# Patient Record
Sex: MALE | Race: WHITE | NOT HISPANIC OR LATINO | Employment: FULL TIME | URBAN - NONMETROPOLITAN AREA
[De-identification: names, ages, dates, MRNs, and addresses within clinical notes are randomized per-mention and may not be internally consistent; named-entity substitution may affect disease eponyms.]

---

## 2019-08-31 ENCOUNTER — OFFICE VISIT (OUTPATIENT)
Dept: INTERNAL MEDICINE | Facility: CLINIC | Age: 24
End: 2019-08-31

## 2019-08-31 VITALS
WEIGHT: 296.2 LBS | DIASTOLIC BLOOD PRESSURE: 100 MMHG | TEMPERATURE: 98.1 F | HEIGHT: 73 IN | OXYGEN SATURATION: 98 % | BODY MASS INDEX: 39.25 KG/M2 | SYSTOLIC BLOOD PRESSURE: 150 MMHG | RESPIRATION RATE: 16 BRPM | HEART RATE: 90 BPM

## 2019-08-31 DIAGNOSIS — H61.22 EXCESSIVE CERUMEN IN EAR CANAL, LEFT: ICD-10-CM

## 2019-08-31 DIAGNOSIS — E66.09 CLASS 2 OBESITY DUE TO EXCESS CALORIES WITHOUT SERIOUS COMORBIDITY WITH BODY MASS INDEX (BMI) OF 39.0 TO 39.9 IN ADULT: ICD-10-CM

## 2019-08-31 DIAGNOSIS — L24.5 IRRITANT CONTACT DERMATITIS DUE TO OTHER CHEMICAL PRODUCTS: ICD-10-CM

## 2019-08-31 DIAGNOSIS — I10 ESSENTIAL HYPERTENSION: Primary | ICD-10-CM

## 2019-08-31 DIAGNOSIS — N34.2 URETHRITIS: ICD-10-CM

## 2019-08-31 PROBLEM — E66.812 CLASS 2 OBESITY DUE TO EXCESS CALORIES WITHOUT SERIOUS COMORBIDITY WITH BODY MASS INDEX (BMI) OF 39.0 TO 39.9 IN ADULT: Status: ACTIVE | Noted: 2019-08-31

## 2019-08-31 PROCEDURE — 99203 OFFICE O/P NEW LOW 30 MIN: CPT | Performed by: FAMILY MEDICINE

## 2019-08-31 RX ORDER — LISINOPRIL 20 MG/1
20 TABLET ORAL DAILY
Qty: 30 TABLET | Refills: 6 | Status: SHIPPED | OUTPATIENT
Start: 2019-08-31 | End: 2020-11-09 | Stop reason: SDUPTHER

## 2019-08-31 RX ORDER — FLUCONAZOLE 200 MG/1
TABLET ORAL
Qty: 2 TABLET | Refills: 0 | Status: SHIPPED | OUTPATIENT
Start: 2019-08-31 | End: 2019-10-08

## 2019-08-31 RX ORDER — LISINOPRIL 20 MG/1
20 TABLET ORAL DAILY
COMMUNITY
End: 2019-08-31 | Stop reason: SDUPTHER

## 2019-08-31 NOTE — PROGRESS NOTES
"        Subjective     Chief Complaint   Patient presents with   • Establish Care   • Hypertension       History of Present Illness  Patient presents today to establish care.  He has a history of high blood pressure for which she takes lisinopril.  He feels this does a good job for him.  He does not initially check it at home.  He notes that whenever he comes to the doctor his blood pressure is always elevated.  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  This last physical was in February.  At that time he had lab work.  He also complains of having a yeast like discharge and notes that he had recently had intercourse with his wife who had a yeast infection.  It is pruritic and itchy.    Patient's PMR from outside medical facility reviewed and noted.    Review of Systems     Otherwise complete ROS reviewed and negative except as mentioned in the HPI.    Past Medical History:   Past Medical History:   Diagnosis Date   • Hypertension      Past Surgical History:  Past Surgical History:   Procedure Laterality Date   • EAR TUBES     • TONSILLECTOMY       Social History:  reports that he has never smoked. His smokeless tobacco use includes snuff. He reports that he does not drink alcohol or use drugs.    Family History: family history includes Diabetes in his paternal grandmother and paternal uncle; Heart attack in his maternal uncle and paternal uncle; Hypertension in his brother, father, and mother.       Allergies:  No Known Allergies  Medications:  Prior to Admission medications    Medication Sig Start Date End Date Taking? Authorizing Provider   lisinopril (PRINIVIL,ZESTRIL) 20 MG tablet Take 20 mg by mouth Daily.   Yes Provider, MD Loretta       Objective     Vital Signs: /100 (BP Location: Left arm, Patient Position: Sitting, Cuff Size: Adult)   Pulse 90   Temp 98.1 °F (36.7 °C) (Oral)   Resp 16   Ht 185.4 cm (73\")   Wt 134 kg (296 lb 3.2 oz)   SpO2 98%   BMI 39.08 kg/m²   Physical " Exam   Constitutional: He is oriented to person, place, and time. He appears well-developed and well-nourished.   HENT:   Head: Normocephalic and atraumatic.   Right Ear: External ear normal.   Left Ear: External ear normal.   Nose: Nose normal.   Mouth/Throat: Oropharynx is clear and moist. No oropharyngeal exudate.   Left external auditory canal has about approximately 50% occlusion with dry cerumen.  The right canal is clear.  Tympanic membranes are intact bilaterally with a normal cone light.   Eyes: Conjunctivae and EOM are normal. Pupils are equal, round, and reactive to light.   Neck: Neck supple. No JVD present.   Cardiovascular: Normal rate, regular rhythm, normal heart sounds and intact distal pulses. Exam reveals no gallop and no friction rub.   No murmur heard.  Pulmonary/Chest: Effort normal and breath sounds normal.   Abdominal: Soft. Bowel sounds are normal.   Musculoskeletal: Normal range of motion.   Neurological: He is alert and oriented to person, place, and time.   Skin: Skin is warm and dry.   Circumferential scaling and redness around bilateral lower legs at the level of his boot top.  He notes that he has this routinely and that is times it does flare.  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  He has in the past had used hydrocortisone cream for this.  He does have a barrier cream available through his  work to put on his legs.   Psychiatric: He has a normal mood and affect. His behavior is normal.   Nursing note and vitals reviewed.      Patient's Body mass index is 39.08 kg/m². BMI is above normal parameters. Recommendations include: exercise counseling and nutrition counseling.      Results Reviewed:  No results found for: GLUCOSE, BUN, CREATININE, NA, K, CL, CO2, CALCIUM, ALT, AST, WBC, HCT, PLT, CHOL, TRIG, HDL, LDL, LDLHDL, HGBA1C      Assessment / Plan     Assessment/Plan:  1. Essential hypertension  Refill lisinopril 20 mg  Monitor blood pressure 2 times a day keep a  log  If not at goal of 130/80 or less return to clinic with log  Bring lab work done at DOT physical for addition to chart    2. Irritant contact dermatitis due to other chemical products  Thick barrier cream to legs prior to exposure    3. Excessive cerumen in ear canal, left  Debrox daily x4 to 5 days then rinse with tepid water    4. Class 2 obesity due to excess calories without serious comorbidity with body mass index (BMI) of 39.0 to 39.9 in adult  Ideal body weight is approximately 220 pounds discussed with this with patient.  Discussed diet exercise.    5. Urethritis  Diflucan 200 mg 1 now and repeat in 3 days       Return in about 6 months (around 2/29/2020). unless patient needs to be seen sooner or acute issues arise.    Code Status: Full    I have discussed the patient results/orders and and plan/recommendation with them at today's visit.      Sasha Neville,    08/31/2019

## 2019-09-02 ENCOUNTER — NURSE TRIAGE (OUTPATIENT)
Dept: CALL CENTER | Facility: HOSPITAL | Age: 24
End: 2019-09-02

## 2019-09-02 NOTE — TELEPHONE ENCOUNTER
"Caller states that he was started on diflucan for a yeast infection on Saturday.  He was getting better until this am.  Information about diflucan given to caller.  Recommended going to an Urgent care today as he is leaving town in the am.    Reason for Disposition  • Health Information question, no triage required and triager able to answer question    Additional Information  • Negative: [1] Caller is not with the adult (patient) AND [2] reporting urgent symptoms  • Negative: Lab result questions  • Negative: Medication questions  • Negative: Caller cannot be reached by phone  • Negative: Caller has already spoken to PCP or another triager  • Negative: RN needs further essential information from caller in order to complete triage  • Negative: Requesting regular office appointment  • Negative: [1] Caller requesting NON-URGENT health information AND [2] PCP's office is the best resource    Answer Assessment - Initial Assessment Questions  1. REASON FOR CALL or QUESTION: \"What is your reason for calling today?\" or \"How can I best help you?\" or \"What question do you have that I can help answer?\"      How long does it take diflucan to work?  I started the pills on Saturday.    Protocols used: INFORMATION ONLY CALL-ADULT-      "

## 2019-09-04 ENCOUNTER — TELEPHONE (OUTPATIENT)
Dept: INTERNAL MEDICINE | Facility: CLINIC | Age: 24
End: 2019-09-04

## 2019-09-04 DIAGNOSIS — N34.2 URETHRITIS: Primary | ICD-10-CM

## 2019-09-04 RX ORDER — AZITHROMYCIN 500 MG/1
1000 TABLET, FILM COATED ORAL DAILY
Qty: 2 TABLET | Refills: 0 | Status: SHIPPED | OUTPATIENT
Start: 2019-09-04 | End: 2019-09-05

## 2019-09-04 NOTE — TELEPHONE ENCOUNTER
Spoke to patient. He states that he has taken the prescribed Diflucan, and it does not seem to be working. He states that symptoms have not resolved. Spoke to Dr. Neville, advised patient to return to office for further testing. He states that he is currently working out of town and will only be home Sunday each week. Dr. Neville verbal order Azithromycin 1000 MG, take two 500mg tablets one time. 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 Patient report no known medication allergy.

## 2019-10-08 ENCOUNTER — OFFICE VISIT (OUTPATIENT)
Dept: INTERNAL MEDICINE | Facility: CLINIC | Age: 24
End: 2019-10-08

## 2019-10-08 VITALS
BODY MASS INDEX: 38.65 KG/M2 | HEIGHT: 73 IN | HEART RATE: 116 BPM | OXYGEN SATURATION: 98 % | WEIGHT: 291.6 LBS | DIASTOLIC BLOOD PRESSURE: 90 MMHG | TEMPERATURE: 100.2 F | SYSTOLIC BLOOD PRESSURE: 153 MMHG

## 2019-10-08 DIAGNOSIS — J02.9 ACUTE PHARYNGITIS, UNSPECIFIED ETIOLOGY: Primary | ICD-10-CM

## 2019-10-08 DIAGNOSIS — I10 ESSENTIAL HYPERTENSION: ICD-10-CM

## 2019-10-08 DIAGNOSIS — J00 ACUTE RHINITIS: ICD-10-CM

## 2019-10-08 PROCEDURE — 99213 OFFICE O/P EST LOW 20 MIN: CPT | Performed by: FAMILY MEDICINE

## 2019-10-08 RX ORDER — CEFDINIR 300 MG/1
300 CAPSULE ORAL 2 TIMES DAILY
Qty: 20 CAPSULE | Refills: 0 | Status: SHIPPED | OUTPATIENT
Start: 2019-10-08 | End: 2020-12-08

## 2019-10-08 RX ORDER — FLUTICASONE PROPIONATE 50 MCG
2 SPRAY, SUSPENSION (ML) NASAL DAILY
Qty: 1 BOTTLE | Refills: 2 | Status: SHIPPED | OUTPATIENT
Start: 2019-10-08 | End: 2020-12-08

## 2019-10-08 NOTE — PROGRESS NOTES
"        Subjective     Chief Complaint   Patient presents with   • Nasal Congestion     2 days   • Cough       History of Present Illness     Patient presents with nasal congestion.  Onset approximately 2 days.  He has not noted any fever or chills at home.  However he did have a temperature of 100.2 at the time of presentation.  There is been no nausea or vomiting.  There is a cough.  Throat is sore.  He does note he has had a tonsillectomy.  He notes he needs a doctor's release for work.  Patient's PMR from outside medical facility reviewed and noted.    Review of Systems     Otherwise complete ROS reviewed and negative except as mentioned in the HPI.    Past Medical History:   Past Medical History:   Diagnosis Date   • Hypertension      Past Surgical History:  Past Surgical History:   Procedure Laterality Date   • EAR TUBES     • TONSILLECTOMY       Social History:  reports that he has never smoked. His smokeless tobacco use includes snuff. He reports that he does not drink alcohol or use drugs.    Family History: family history includes Diabetes in his paternal grandmother and paternal uncle; Heart attack in his maternal uncle and paternal uncle; Hypertension in his brother, father, and mother.       Allergies:  No Known Allergies  Medications:  Prior to Admission medications    Medication Sig Start Date End Date Taking? Authorizing Provider   lisinopril (PRINIVIL,ZESTRIL) 20 MG tablet Take 1 tablet by mouth Daily. 8/31/19  Yes Sasha Nevilel DO   fluconazole (DIFLUCAN) 200 MG tablet Take one tablet now and repeat in three days. 8/31/19 10/8/19  Sasha Neville DO       Objective     Vital Signs: /90 (BP Location: Left arm, Patient Position: Sitting, Cuff Size: Large Adult)   Pulse 116   Temp 100.2 °F (37.9 °C) (Oral)   Ht 185.4 cm (73\")   Wt 132 kg (291 lb 9.6 oz)   SpO2 98%   BMI 38.47 kg/m²   Physical Exam   Constitutional: He is oriented to person, place, and time. He appears " well-developed and well-nourished.   HENT:   Head: Normocephalic and atraumatic.   Right Ear: External ear normal.   Left Ear: External ear normal.   Mouth/Throat: Oropharyngeal exudate present.   Patient's posterior oropharynx is markedly erythematous with lymphoid tissue hypertrophy.  The left tonsillar pillar has what appears to be pus she a small amount of exudate.  The nasal tissue is markedly edematous the left nares has a small passage that remains open the right nares is totally occluded.  The tympanic membranes are intact.  There is scarring from previous PE tubes.   Eyes: Conjunctivae and EOM are normal. Pupils are equal, round, and reactive to light.   Neck: Normal range of motion. Neck supple.   Cardiovascular: Normal rate, regular rhythm, normal heart sounds and intact distal pulses. Exam reveals no gallop and no friction rub.   No murmur heard.  Pulmonary/Chest: Effort normal and breath sounds normal.   Abdominal: Soft. Bowel sounds are normal.   Musculoskeletal: Normal range of motion.   Lymphadenopathy:     He has cervical adenopathy.   Neurological: He is alert and oriented to person, place, and time.   Skin: Skin is warm and dry.   Psychiatric: He has a normal mood and affect. His behavior is normal.   Nursing note and vitals reviewed.            Results Reviewed:  No results found for: GLUCOSE, BUN, CREATININE, NA, K, CL, CO2, CALCIUM, ALT, AST, WBC, HCT, PLT, CHOL, TRIG, HDL, LDL, LDLHDL, HGBA1C      Assessment / Plan     Assessment/Plan:  1. Acute pharyngitis, unspecified etiology  Omnicef 300 mg twice daily 10 days  Gargle salt water  2. Acute rhinitis  Flonase 1 puff each nares daily    3. Essential hypertension  Monitor blood pressure continue his lisinopril.  He notes that he has not had a yet today he takes it in the evening.        No Follow-up on file. unless patient needs to be seen sooner or acute issues arise.    Code Status: Full    I have discussed the patient results/orders and and  plan/recommendation with them at today's visit.      Sasha Neville DO   10/08/2019

## 2020-05-11 ENCOUNTER — TELEPHONE (OUTPATIENT)
Dept: INTERNAL MEDICINE | Facility: CLINIC | Age: 25
End: 2020-05-11

## 2020-11-09 ENCOUNTER — OFFICE VISIT (OUTPATIENT)
Dept: INTERNAL MEDICINE | Facility: CLINIC | Age: 25
End: 2020-11-09

## 2020-11-09 VITALS
WEIGHT: 288 LBS | SYSTOLIC BLOOD PRESSURE: 157 MMHG | HEART RATE: 90 BPM | TEMPERATURE: 97.6 F | BODY MASS INDEX: 38.17 KG/M2 | HEIGHT: 73 IN | DIASTOLIC BLOOD PRESSURE: 105 MMHG | OXYGEN SATURATION: 99 %

## 2020-11-09 DIAGNOSIS — H66.004 RECURRENT ACUTE SUPPURATIVE OTITIS MEDIA OF RIGHT EAR WITHOUT SPONTANEOUS RUPTURE OF TYMPANIC MEMBRANE: Primary | ICD-10-CM

## 2020-11-09 DIAGNOSIS — I10 ESSENTIAL HYPERTENSION: ICD-10-CM

## 2020-11-09 PROCEDURE — 99213 OFFICE O/P EST LOW 20 MIN: CPT | Performed by: NURSE PRACTITIONER

## 2020-11-09 RX ORDER — LISINOPRIL 20 MG/1
20 TABLET ORAL DAILY
Qty: 30 TABLET | Refills: 6 | Status: SHIPPED | OUTPATIENT
Start: 2020-11-09 | End: 2022-12-05

## 2020-11-09 NOTE — PROGRESS NOTES
"        Subjective     Chief Complaint   Patient presents with   • Earache   • Hypertension     pt needs bp medication refilled       History of Present Illness  Pt comes in today with a complaint of right ear pain that started yesterday and elevated blood pressure.  He states that the only exacerbating factor is when  \"wind hits my ear\".  He denies fever but states he does have clear sinus drainage.  He states he is taking a [employer] brand medication called day sinus relief that he started 2 to 3 days ago.  Alleviating factor factors are being indoor or in his vehicle.  He reports a long history of ear problems and had tubes placed as a child.  He states he can always tell when he is \"about to have a problem\" so he will wanted to have it seen early.  He also reports home blood pressures of 140s over 100s and recently moved and lost his blood pressure medication.  He states he has been without his medication for approximately 2 weeks.     Otherwise complete ROS reviewed and negative except as mentioned in the HPI.    Past Medical History:   Past Medical History:   Diagnosis Date   • Ear ache    • Hypertension      Past Surgical History:  Past Surgical History:   Procedure Laterality Date   • EAR TUBES     • TONSILLECTOMY       Social History:  reports that he has never smoked. His smokeless tobacco use includes snuff. He reports that he does not drink alcohol or use drugs.    Family History: family history includes Diabetes in his paternal grandmother and paternal uncle; Heart attack in his maternal uncle and paternal uncle; Hypertension in his brother, father, and mother.      Allergies:  No Known Allergies  Medications:  Prior to Admission medications    Medication Sig Start Date End Date Taking? Authorizing Provider   lisinopril (PRINIVIL,ZESTRIL) 20 MG tablet Take 1 tablet by mouth Daily. 11/9/20  Yes Henny Landa APRN   lisinopril (PRINIVIL,ZESTRIL) 20 MG tablet Take 1 tablet by mouth Daily. " "8/31/19 11/9/20 Yes Sasha Neville DO   cefdinir (OMNICEF) 300 MG capsule Take 1 capsule by mouth 2 (Two) Times a Day. 10/8/19   Sasha Neville DO   fluticasone (FLONASE) 50 MCG/ACT nasal spray 2 sprays into the nostril(s) as directed by provider Daily. 10/8/19   Sasha Neville DO       Objective     Vital Signs: BP (!) 157/105 (BP Location: Left arm, Patient Position: Sitting, Cuff Size: Adult)   Pulse 90   Temp 97.6 °F (36.4 °C) (Infrared)   Ht 185.4 cm (73\")   Wt 131 kg (288 lb)   SpO2 99%   BMI 38.00 kg/m²   Physical Exam  Vitals signs reviewed.   Constitutional:       Appearance: He is well-developed.   HENT:      Head: Normocephalic and atraumatic.      Right Ear: Tenderness present. Tympanic membrane is scarred and bulging.      Left Ear: Tympanic membrane is scarred.      Nose: Rhinorrhea present.      Mouth/Throat:      Pharynx: Posterior oropharyngeal erythema present.   Eyes:      Extraocular Movements: Extraocular movements intact.      Pupils: Pupils are equal, round, and reactive to light.   Neck:      Musculoskeletal: Normal range of motion and neck supple.      Vascular: No JVD.   Cardiovascular:      Rate and Rhythm: Normal rate and regular rhythm.   Pulmonary:      Effort: Pulmonary effort is normal.   Abdominal:      General: Bowel sounds are normal.      Palpations: Abdomen is soft.   Musculoskeletal:         General: No deformity.   Lymphadenopathy:      Cervical: No cervical adenopathy.   Skin:     General: Skin is warm and dry.   Neurological:      Mental Status: He is alert and oriented to person, place, and time.   Psychiatric:         Behavior: Behavior normal.         Thought Content: Thought content normal.         Judgment: Judgment normal.       Results Reviewed:  No results found for: GLUCOSE, BUN, CREATININE, NA, K, CL, CO2, CALCIUM, ALT, AST, WBC, HCT, PLT, CHOL, TRIG, HDL, LDL, LDLHDL, HGBA1C      Assessment / Plan     Assessment/Plan:  Diagnoses and all " orders for this visit:    1. Recurrent acute suppurative otitis media of right ear without spontaneous rupture of tympanic membrane (Primary)  -     neomycin-polymyxin-hydrocortisone (CORTISPORIN) 3.5-64107-0 otic solution; Administer 3 drops to the right ear 4 (Four) Times a Day.  Dispense: 10 mL; Refill: 0    2. Essential hypertension  -     lisinopril (PRINIVIL,ZESTRIL) 20 MG tablet; Take 1 tablet by mouth Daily.  Dispense: 30 tablet; Refill: 6      Return in about 2 weeks (around 11/23/2020). unless patient needs to be seen sooner or acute issues arise.    Code Status: Full.     I have discussed the patient results/orders and and plan/recommendation with them at today's visit.      Henny Landa, APRN   11/09/2020

## 2020-12-08 ENCOUNTER — OFFICE VISIT (OUTPATIENT)
Dept: INTERNAL MEDICINE | Facility: CLINIC | Age: 25
End: 2020-12-08

## 2020-12-08 VITALS
DIASTOLIC BLOOD PRESSURE: 95 MMHG | SYSTOLIC BLOOD PRESSURE: 152 MMHG | TEMPERATURE: 96.6 F | HEIGHT: 73 IN | OXYGEN SATURATION: 98 % | RESPIRATION RATE: 18 BRPM | WEIGHT: 287.6 LBS | BODY MASS INDEX: 38.12 KG/M2 | HEART RATE: 94 BPM

## 2020-12-08 DIAGNOSIS — J30.2 SEASONAL ALLERGIES: ICD-10-CM

## 2020-12-08 DIAGNOSIS — J30.2 SEASONAL ALLERGIC RHINITIS, UNSPECIFIED TRIGGER: ICD-10-CM

## 2020-12-08 DIAGNOSIS — H65.193 OTHER NON-RECURRENT ACUTE NONSUPPURATIVE OTITIS MEDIA OF BOTH EARS: Primary | ICD-10-CM

## 2020-12-08 DIAGNOSIS — I10 ESSENTIAL HYPERTENSION: ICD-10-CM

## 2020-12-08 PROCEDURE — 99213 OFFICE O/P EST LOW 20 MIN: CPT | Performed by: FAMILY MEDICINE

## 2020-12-08 RX ORDER — METHYLPREDNISOLONE 4 MG/1
TABLET ORAL
Qty: 1 EACH | Refills: 0 | Status: SHIPPED | OUTPATIENT
Start: 2020-12-08

## 2020-12-08 RX ORDER — AMLODIPINE BESYLATE 10 MG/1
10 TABLET ORAL DAILY
Qty: 30 TABLET | Refills: 3 | Status: SHIPPED | OUTPATIENT
Start: 2020-12-08

## 2020-12-08 RX ORDER — FLUTICASONE PROPIONATE 50 MCG
2 SPRAY, SUSPENSION (ML) NASAL DAILY
Qty: 1 BOTTLE | Refills: 6 | Status: SHIPPED | OUTPATIENT
Start: 2020-12-08

## 2020-12-08 RX ORDER — LORATADINE 10 MG/1
10 TABLET ORAL DAILY
Qty: 30 TABLET | Refills: 3 | Status: SHIPPED | OUTPATIENT
Start: 2020-12-08

## 2020-12-08 RX ORDER — CEFDINIR 300 MG/1
300 CAPSULE ORAL 2 TIMES DAILY
Qty: 20 CAPSULE | Refills: 0 | Status: SHIPPED | OUTPATIENT
Start: 2020-12-08 | End: 2022-12-15 | Stop reason: SDUPTHER

## 2020-12-08 NOTE — PROGRESS NOTES
Subjective     Chief Complaint   Patient presents with   • Hypertension   • Earache     Drops for infection. Hard of hearing.        History of Present Illness  Blood pressures been elevated.  He takes his lisinopril routinely.  He is doing well with it.  His ears are bothering him.  He cannot hear well.  He did the drops that the nurse practitioner given him and it seemed to improve his initial problem but he is unable to hear well now he feels like he is listening to muffled sounds all the time.  He has had some allergy symptoms.  He has some this time in the year every year.    Patient's PMR from outside medical facility reviewed and noted.    Review of Systems     Otherwise complete ROS reviewed and negative except as mentioned in the HPI.    Past Medical History:   Past Medical History:   Diagnosis Date   • Ear ache    • Hypertension      Past Surgical History:  Past Surgical History:   Procedure Laterality Date   • EAR TUBES     • TONSILLECTOMY       Social History:  reports that he has never smoked. His smokeless tobacco use includes snuff. He reports that he does not drink alcohol or use drugs.    Family History: family history includes Diabetes in his paternal grandmother and paternal uncle; Heart attack in his maternal uncle and paternal uncle; Hypertension in his brother, father, and mother.       Allergies:  No Known Allergies  Medications:  Prior to Admission medications    Medication Sig Start Date End Date Taking? Authorizing Provider   lisinopril (PRINIVIL,ZESTRIL) 20 MG tablet Take 1 tablet by mouth Daily. 11/9/20  Yes Henny Landa APRN   neomycin-polymyxin-hydrocortisone (CORTISPORIN) 3.5-28669-0 otic solution Administer 3 drops to the right ear 4 (Four) Times a Day. 11/9/20  Yes Henny Landa APRN   amLODIPine (Norvasc) 10 MG tablet Take 1 tablet by mouth Daily. 12/8/20   Sasha Neville DO   cefdinir (OMNICEF) 300 MG capsule Take 1 capsule by mouth 2 (Two)  "Times a Day. 12/8/20   Sasha Neville DO   fluticasone (Flonase) 50 MCG/ACT nasal spray 2 sprays into the nostril(s) as directed by provider Daily. 12/8/20   Sasha Neville DO   loratadine (Claritin) 10 MG tablet Take 1 tablet by mouth Daily. 12/8/20   Sasha Neville DO   methylPREDNISolone (MEDROL) 4 MG dose pack Take as directed on package instructions. 12/8/20   Sasha Neville DO   cefdinir (OMNICEF) 300 MG capsule Take 1 capsule by mouth 2 (Two) Times a Day. 10/8/19 12/8/20  Sasha Neville DO   fluticasone (FLONASE) 50 MCG/ACT nasal spray 2 sprays into the nostril(s) as directed by provider Daily. 10/8/19 12/8/20  Sasha Neville DO       Objective     Vital Signs: /95 (BP Location: Left arm, Patient Position: Sitting, Cuff Size: Large Adult)   Pulse 94   Temp 96.6 °F (35.9 °C) (Skin)   Resp 18   Ht 185.4 cm (73\")   Wt 130 kg (287 lb 9.6 oz)   SpO2 98%   BMI 37.94 kg/m²   Physical Exam  Vitals signs and nursing note reviewed.   Constitutional:       Appearance: Normal appearance.   HENT:      Head: Normocephalic and atraumatic.      Right Ear: Ear canal and external ear normal.      Left Ear: Ear canal and external ear normal.      Ears:      Comments: Bilateral tympanic membranes are distended and erythematous.     Nose:      Comments: Nasal mucosa is edematous and erythematous bilaterally.     Mouth/Throat:      Mouth: Mucous membranes are moist.      Pharynx: Posterior oropharyngeal erythema present.   Eyes:      Extraocular Movements: Extraocular movements intact.      Conjunctiva/sclera: Conjunctivae normal.      Pupils: Pupils are equal, round, and reactive to light.   Neck:      Musculoskeletal: Normal range of motion.   Cardiovascular:      Rate and Rhythm: Normal rate and regular rhythm.      Pulses: Normal pulses.      Heart sounds: Normal heart sounds.   Pulmonary:      Effort: Pulmonary effort is normal.      Breath sounds: Normal breath sounds. "   Abdominal:      General: Bowel sounds are normal.      Palpations: Abdomen is soft.   Musculoskeletal: Normal range of motion.   Skin:     General: Skin is warm and dry.      Capillary Refill: Capillary refill takes less than 2 seconds.   Neurological:      General: No focal deficit present.      Mental Status: He is alert and oriented to person, place, and time.   Psychiatric:         Mood and Affect: Mood normal.         Behavior: Behavior normal.         Thought Content: Thought content normal.         Judgment: Judgment normal.         Results Reviewed:  No results found for: GLUCOSE, BUN, CREATININE, NA, K, CL, CO2, CALCIUM, ALT, AST, WBC, HCT, PLT, CHOL, TRIG, HDL, LDL, LDLHDL, HGBA1C      Assessment / Plan     Assessment/Plan:  1. Other non-recurrent acute nonsuppurative otitis media of both ears  Omnicef 300 mg twice daily 10 days    2. Seasonal allergies  Flonase 1 puff each nares daily  Claritin 10 mg daily    3. Seasonal allergic rhinitis, unspecified trigger      4. Essential hypertension  Add amlodipine 10 mg daily        Return in about 2 weeks (around 12/22/2020) for ear recheck. unless patient needs to be seen sooner or acute issues arise.        I have discussed the patient results/orders and and plan/recommendation with them at today's visit.      Sasha Neville, DO   12/08/2020

## 2021-09-20 ENCOUNTER — OFFICE VISIT (OUTPATIENT)
Dept: INTERNAL MEDICINE | Facility: CLINIC | Age: 26
End: 2021-09-20

## 2021-09-20 VITALS
HEART RATE: 103 BPM | BODY MASS INDEX: 35.78 KG/M2 | OXYGEN SATURATION: 98 % | RESPIRATION RATE: 16 BRPM | HEIGHT: 73 IN | SYSTOLIC BLOOD PRESSURE: 160 MMHG | TEMPERATURE: 98.4 F | WEIGHT: 270 LBS | DIASTOLIC BLOOD PRESSURE: 80 MMHG

## 2021-09-20 DIAGNOSIS — J02.9 PHARYNGITIS, UNSPECIFIED ETIOLOGY: Primary | ICD-10-CM

## 2021-09-20 DIAGNOSIS — R50.9 FEVER, UNSPECIFIED FEVER CAUSE: ICD-10-CM

## 2021-09-20 PROCEDURE — U0004 COV-19 TEST NON-CDC HGH THRU: HCPCS | Performed by: NURSE PRACTITIONER

## 2021-09-20 PROCEDURE — 99213 OFFICE O/P EST LOW 20 MIN: CPT | Performed by: NURSE PRACTITIONER

## 2021-09-20 RX ORDER — AMOXICILLIN AND CLAVULANATE POTASSIUM 875; 125 MG/1; MG/1
1 TABLET, FILM COATED ORAL 2 TIMES DAILY
Qty: 14 TABLET | Refills: 0 | Status: SHIPPED | OUTPATIENT
Start: 2021-09-20 | End: 2021-09-20

## 2021-09-20 RX ORDER — CEFDINIR 300 MG/1
300 CAPSULE ORAL 2 TIMES DAILY
Qty: 20 CAPSULE | Refills: 0 | Status: SHIPPED | OUTPATIENT
Start: 2021-09-20 | End: 2022-12-15 | Stop reason: SDUPTHER

## 2021-09-20 NOTE — PROGRESS NOTES
"        Subjective     Chief Complaint   Patient presents with   • Cough   • Fever   • Headache       History of Present Illness  Patient comes in today with fever, headache, cough.  Onset of symptoms was yesterday. Complains of fever this am just states that he knew he had a fever he did not check it. Cough-non--productive.  Admits to headache without any congestion loss of taste or smell.  No shortness of breath.  States he does have significant allergies that are oftentimes at all in his ears and throat.  Complains of a significant sore throat.  He does tell me that he has had ear problems \"all my life.\" No COVID contacts. He refuses the vaccine.     Review of Systems   Otherwise complete ROS reviewed.    Past Medical History:   Past Medical History:   Diagnosis Date   • Ear ache    • Hypertension      Past Surgical History:  Past Surgical History:   Procedure Laterality Date   • EAR TUBES     • TONSILLECTOMY       Social History:  reports that he has never smoked. His smokeless tobacco use includes snuff. He reports that he does not drink alcohol and does not use drugs.    Family History: family history includes Diabetes in his paternal grandmother and paternal uncle; Heart attack in his maternal uncle and paternal uncle; Hypertension in his brother, father, and mother.       Allergies:  No Known Allergies  Medications:  Prior to Admission medications    Medication Sig Start Date End Date Taking? Authorizing Provider   amLODIPine (Norvasc) 10 MG tablet Take 1 tablet by mouth Daily. 12/8/20   Sasha Neville DO   cefdinir (OMNICEF) 300 MG capsule Take 1 capsule by mouth 2 (Two) Times a Day. 12/8/20   Sasha Neville DO   fluticasone (Flonase) 50 MCG/ACT nasal spray 2 sprays into the nostril(s) as directed by provider Daily. 12/8/20   Sasha Neville DO   lisinopril (PRINIVIL,ZESTRIL) 20 MG tablet Take 1 tablet by mouth Daily. 11/9/20   Henny Landa APRN   loratadine (Claritin) 10 MG " "tablet Take 1 tablet by mouth Daily. 12/8/20   Sasha Neville DO   methylPREDNISolone (MEDROL) 4 MG dose pack Take as directed on package instructions. 12/8/20   Sasha Neville DO   neomycin-polymyxin-hydrocortisone (CORTISPORIN) 3.5-66717-2 otic solution Administer 3 drops to the right ear 4 (Four) Times a Day. 11/9/20   Henny Landa APRN       Objective     Vital Signs: /80   Pulse 103   Temp 98.4 °F (36.9 °C)   Resp 16   Ht 185.4 cm (73\")   Wt 122 kg (270 lb)   SpO2 98%   BMI 35.62 kg/m²   Physical Exam  Vitals reviewed.   Constitutional:       Appearance: He is well-developed.   HENT:      Head: Normocephalic and atraumatic.   Eyes:      Pupils: Pupils are equal, round, and reactive to light.   Neck:      Vascular: No JVD.   Cardiovascular:      Rate and Rhythm: Normal rate and regular rhythm.   Pulmonary:      Effort: Pulmonary effort is normal.      Breath sounds: Normal breath sounds.   Abdominal:      General: Bowel sounds are normal.      Palpations: Abdomen is soft.   Musculoskeletal:         General: No deformity.      Cervical back: Normal range of motion and neck supple.   Lymphadenopathy:      Cervical: No cervical adenopathy.   Skin:     General: Skin is warm and dry.   Neurological:      Mental Status: He is alert and oriented to person, place, and time.   Psychiatric:         Behavior: Behavior normal.         Thought Content: Thought content normal.         Judgment: Judgment normal.       Results Reviewed:  No results found for: GLUCOSE, BUN, CREATININE, NA, K, CL, CO2, CALCIUM, ALT, AST, WBC, HCT, PLT, CHOL, TRIG, HDL, LDL, LDLHDL, HGBA1C      Assessment / Plan     Assessment/Plan:  Diagnoses and all orders for this visit:    1. Pharyngitis, unspecified etiology (Primary)  -     cefdinir (OMNICEF) 300 MG capsule; Take 1 capsule by mouth 2 (Two) Times a Day.  Dispense: 20 capsule; Refill: 0    2. Fever, unspecified fever cause  -     COVID-19,APTIMA PANTHER,PAD " IN-HOUSE,NP/OP/NASAL SWAB IN UTM/VTM/SALINE/LIQUID AMIES TRANSPORT MEDIA/NP WASH OR ASPIRATE, 24 HR TAT - Swab, Nasal Cavity      No follow-ups on file. unless patient needs to be seen sooner or acute issues arise.    Code Status: full.     I have discussed the patient results/orders and and plan/recommendation with them at today's visit.      Henny Landa, APRN   09/20/2021

## 2021-09-21 LAB — SARS-COV-2 ORF1AB RESP QL NAA+PROBE: DETECTED

## 2021-09-22 ENCOUNTER — TELEPHONE (OUTPATIENT)
Dept: INTERNAL MEDICINE | Facility: CLINIC | Age: 26
End: 2021-09-22

## 2021-09-22 NOTE — TELEPHONE ENCOUNTER
COVID-19 Test Result   Telephone Encounter    Patient Name: Adolfo Monge Jr.   : 1995   MRN: [id number]     COVID19   Date Value Ref Range Status   2021 Detected (C) Not Detected - Ref. Range Final        Patient was counseled as follows:  • (+) positive COVID-19 test result with or without symptoms   • The majority of patients with a positive test result will recover, symptom severity is variable among those infected.   • Certain comorbidities such as COPD/asthma, DM, CAD and others can increase the risk of more severe illness.   • The optimal duration of home isolation is uncertain. The [place] Centers for Disease Control and Prevention (CDC) has issued recommendations on discontinuation of home isolation.  • For this reason, Adolfo is strongly encouraged to practice the safest standards in protecting their health and others given the current pandemic concerns.   • If Adolfo is asymptomatic, he should self-isolate at home for a total of 14 days from time of when lab test for Covid-19 was collected.   o If he is without symptoms, then he should practice social distancing by staying at least 6 feet from other people, including limiting contact with family (especially at home) and friends as much as possible for at least 14 days.   o Encouraged him to wear a mask in addition to social distancing in the home.   o Implement 14 day home self-quarantining, with the exception of seeking required or essential medical care.   o Continue to wash his hands frequently with soap and hot water, and cover their mouth while coughing.   • If a Adolfo is symptomatic then he may discontinue home isolation when the following criteria are met:   o At least seven days have passed since symptoms first appeared AND   o At least three days (72 hours) have passed since recovery of symptoms (defined as resolution of fever without the use of fever-reducing medications and improvement in respiratory symptoms [eg, cough,  shortness of breath])   • For support of non-emergent symptoms expected with this virus such as increased shortness of breath, fever, cough, or other questions, Adolfo was asked to please call their primary care physician’s office or the [place] hotline at [phone].   • He was advised to seek care in the Emergency Department should extreme symptoms arise such as new onset confusion, extreme lethargy, hypoxia, or new hypotension.   · Questions were engaged and answered to the best of my ability, patient expressed verbal understanding of their test results and my advice.      Primary Care Physician verified as being: Sasha Neville DO      Electronically signed by aY Alcaraz MA, 09/22/21, 7:36 AM CDT.

## 2021-09-27 ENCOUNTER — TELEPHONE (OUTPATIENT)
Dept: INTERNAL MEDICINE | Facility: CLINIC | Age: 26
End: 2021-09-27

## 2021-09-27 ENCOUNTER — CLINICAL SUPPORT (OUTPATIENT)
Dept: INTERNAL MEDICINE | Facility: CLINIC | Age: 26
End: 2021-09-27

## 2021-09-27 DIAGNOSIS — R05.9 COUGH: Primary | ICD-10-CM

## 2021-09-27 PROCEDURE — 99211 OFF/OP EST MAY X REQ PHY/QHP: CPT | Performed by: NURSE PRACTITIONER

## 2021-09-27 PROCEDURE — U0004 COV-19 TEST NON-CDC HGH THRU: HCPCS | Performed by: NURSE PRACTITIONER

## 2021-09-27 NOTE — PROGRESS NOTES
Adolfo Monge   1995  [id number]    Verified patient's NAME and  before test was performed.     COVID-19 Test Performed:   Nasopharyngeal Swab     Location:  PATIENT VEHICLE     How did the patient tolerate the test?   Well tolerated by patient..    Staff Member Performing Test:  Cally Lockhart RN    PPE Worn:    mask, gloves, eye protection, face shield, gown and respirator        Patient presented for COVID-19 testing as ordered by the provider. Appropriate PPE donned. Patient tolerated well. Patient was given COVID-19 Fact Sheet, How to Quarantine at Home Instructions, and Infection Prevention in the Home handout. Patient advised that results are expected within 2-4 days depending on the time of test.     While waiting for results of test, patient was asked to please call their primary care physician's office or the [place] hotline at [phone] for support of non-emergent symptoms expected with this virus such as increased shortness of breath, fever, cough, or other questions.    Patient was advised to seek care in the Emergency Department should extreme symptoms arise such as new onset confusion, extreme lethargy, hypoxia, or new hypotension.     Questions were engaged and answered to the best of my ability, patient expressed verbal understanding.

## 2021-09-27 NOTE — TELEPHONE ENCOUNTER
Caller: Adolfo Monge Jr.    Relationship to patient: Self    Best call back number: [phone]    Patient is needing a negative COVID test to return to work. He would like to return on 9/29, if test is negative. He would prefer a rapid test, if possible. Please advise.

## 2021-09-28 LAB — SARS-COV-2 ORF1AB RESP QL NAA+PROBE: NOT DETECTED

## 2021-09-29 ENCOUNTER — TELEPHONE (OUTPATIENT)
Dept: INTERNAL MEDICINE | Facility: CLINIC | Age: 26
End: 2021-09-29

## 2021-09-29 NOTE — TELEPHONE ENCOUNTER
COVID-19 Test Result   Telephone Encounter    Patient Name: Adolfo Monge Jr.   : 1995   MRN: [id number]     COVID19   Date Value Ref Range Status   2021 Not Detected Not Detected - Ref. Range Final        Patient was counseled as follows:  • (-) negative COVID-19 test result with or without symptoms   • The test is not perfect, so there is a chance it could be falsely negative or the virus level is too low for detection due to being very early in the infectious process.   • The optimal duration of home isolation is uncertain. The [place] Centers for Disease Control and Prevention (CDC) has issued recommendations on discontinuation of home isolation.   • For this reason, Adolfo is strongly encouraged to practice the safest standards in protecting their health and others given the current pandemic concerns. He is advised to:   o Practice social distancing in the community by staying at least 6 feet away from people   o Encouraged to use face mask while out in public   o Continue to wash their hands frequently with soap and hot water, and cover their mouth while coughing.   • If Adolfo is asymptomatic, he should self isolate for a total of 14 days from time of potential contact with Covid-19.   • If Adolfo is symptomatic then he may discontinue home isolation when the following criteria are met:   o At least seven days have passed since symptoms first appeared AND   o At least three days (72 hours) have passed since recovery of symptoms (defined as resolution of fever without the use of fever-reducing medications and improvement in respiratory symptoms [e.g., cough, shortness of breath])   • If Adolfo has been asymptomatic but then develops non-emergent symptoms such as mild increased shortness of breath, fever, cough, or for other questions, he  was asked to please call their primary care physician’s office or the [place] hopscoutline at [phone].   · Questions were engaged and answered to the best  of my ability. He         expressed verbal understanding of their test results and my advice.    Primary Care Physician verified as being: Sasha Neville DO      Electronically signed by Ya Alcaraz MA, 09/29/21, 8:30 AM CDT.

## 2022-12-05 ENCOUNTER — OFFICE VISIT (OUTPATIENT)
Dept: INTERNAL MEDICINE | Facility: CLINIC | Age: 27
End: 2022-12-05

## 2022-12-05 VITALS
DIASTOLIC BLOOD PRESSURE: 99 MMHG | RESPIRATION RATE: 18 BRPM | HEIGHT: 73 IN | SYSTOLIC BLOOD PRESSURE: 173 MMHG | TEMPERATURE: 98.2 F | HEART RATE: 100 BPM | OXYGEN SATURATION: 99 % | WEIGHT: 308.38 LBS | BODY MASS INDEX: 40.87 KG/M2

## 2022-12-05 DIAGNOSIS — I10 ESSENTIAL HYPERTENSION: Primary | ICD-10-CM

## 2022-12-05 PROBLEM — Z72.0 TOBACCO USER: Status: ACTIVE | Noted: 2022-12-05

## 2022-12-05 PROBLEM — S63.509A SPRAIN OF WRIST: Status: ACTIVE | Noted: 2022-12-05

## 2022-12-05 PROBLEM — S99.919A INJURY OF ANKLE: Status: ACTIVE | Noted: 2022-12-05

## 2022-12-05 PROBLEM — L40.9 PSORIASIS: Status: ACTIVE | Noted: 2022-12-05

## 2022-12-05 PROBLEM — R07.9 CHEST PAIN: Status: ACTIVE | Noted: 2022-12-05

## 2022-12-05 PROBLEM — L20.9 ATOPIC DERMATITIS: Status: ACTIVE | Noted: 2022-12-05

## 2022-12-05 PROCEDURE — 99213 OFFICE O/P EST LOW 20 MIN: CPT | Performed by: NURSE PRACTITIONER

## 2022-12-05 RX ORDER — OLMESARTAN MEDOXOMIL 20 MG/1
20 TABLET ORAL DAILY
Qty: 30 TABLET | Refills: 6 | Status: SHIPPED | OUTPATIENT
Start: 2022-12-05 | End: 2022-12-27 | Stop reason: SDUPTHER

## 2022-12-05 NOTE — PROGRESS NOTES
Subjective     Chief Complaint   Patient presents with   • Hypertension       History of Present Illness  Adolfo Monge is a 27-year-old male who presents today for a follow-up of hypertension.    The patient reports routinely monitoring his blood pressure at home. He states that his systolic blood pressure is typically between 145-170 mmHg and his diastolic pressure is between 80-90 mmHg. He recalls taking lisinopril in the past, but does not recall taking amlodipine, which was prescribed in 12/2020 by Dr. Neville. He does not feel that the lisinopril was effective. He states that he was previously prescribed lisinopril at 18 years of age by a primary care physician in [place]. It worked for a short period of time then. His dose was increased, but it still was not effective. He has not taken losartan, Cozaar, or Benicar.     He denies any chest pain, shortness of breath, edema in his feet or ankles, or headaches. He denies having any issues with bowel or bladder. He does have allergies, but states that this is normal. He has had COVID-19 vaccinations as he has to have this for work. He has recently had laboratory studies performed for his physical at work.     He does not recall having an echocardiogram in the past. He does report having EKGs performed. He works in [place], [place]    Patient's PMR from outside medical facility reviewed and noted.    Review of Systems     Otherwise complete ROS reviewed and negative except as mentioned in the HPI.    Past Medical History:   Past Medical History:   Diagnosis Date   • Ear ache    • Hypertension      Past Surgical History:  Past Surgical History:   Procedure Laterality Date   • EAR TUBES     • TONSILLECTOMY       Social History:  reports that he has never smoked. His smokeless tobacco use includes snuff. He reports that he does not drink alcohol and does not use drugs.    Family History: family history includes Diabetes in his paternal grandmother and paternal uncle;  Heart attack in his maternal uncle and paternal uncle; Hypertension in his brother, father, and mother.      Allergies:  No Known Allergies  Medications:  Prior to Admission medications    Medication Sig Start Date End Date Taking? Authorizing Provider   amLODIPine (Norvasc) 10 MG tablet Take 1 tablet by mouth Daily. 12/8/20  Yes Sasha Neville, DO   fluticasone (Flonase) 50 MCG/ACT nasal spray 2 sprays into the nostril(s) as directed by provider Daily. 12/8/20  Yes Sasha Neville, DO   loratadine (Claritin) 10 MG tablet Take 1 tablet by mouth Daily. 12/8/20  Yes Sasha Neville, DO   neomycin-polymyxin-hydrocortisone (CORTISPORIN) 3.5-97712-4 otic solution Administer 3 drops to the right ear 4 (Four) Times a Day. 11/9/20  Yes Henny Landa APRN   lisinopril (PRINIVIL,ZESTRIL) 20 MG tablet Take 1 tablet by mouth Daily. 11/9/20 12/5/22 Yes Henny Landa APRN   cefdinir (OMNICEF) 300 MG capsule Take 1 capsule by mouth 2 (Two) Times a Day. 12/8/20   Sasha Neville,    cefdinir (OMNICEF) 300 MG capsule Take 1 capsule by mouth 2 (Two) Times a Day. 9/20/21   Henny Landa APRN   methylPREDNISolone (MEDROL) 4 MG dose pack Take as directed on package instructions. 12/8/20   Sasha Neville DO   olmesartan (Benicar) 20 MG tablet Take 1 tablet by mouth Daily. 12/5/22   Henny Landa APRN       PHQ-9 Depression Screening  Little interest or pleasure in doing things? 0-->not at all   Feeling down, depressed, or hopeless? 0-->not at all   Trouble falling or staying asleep, or sleeping too much?     Feeling tired or having little energy?     Poor appetite or overeating?     Feeling bad about yourself - or that you are a failure or have let yourself or your family down?     Trouble concentrating on things, such as reading the newspaper or watching television?     Moving or speaking so slowly that other people could have noticed? Or the opposite - being so  "fidgety or restless that you have been moving around a lot more than usual?     Thoughts that you would be better off dead, or of hurting yourself in some way?     PHQ-9 Total Score 0   If you checked off any problems, how difficult have these problems made it for you to do your work, take care of things at home, or get along with other people?                Objective     Vital Signs: /99   Pulse 100   Temp 98.2 °F (36.8 °C)   Resp 18   Ht 185.4 cm (73\")   Wt (!) 140 kg (308 lb 6 oz)   SpO2 99%   BMI 40.69 kg/m²   Physical Exam  Vitals reviewed.   Constitutional:       Appearance: He is well-developed. He is obese.   HENT:      Head: Normocephalic and atraumatic.   Eyes:      Pupils: Pupils are equal, round, and reactive to light.   Neck:      Vascular: No JVD.   Cardiovascular:      Rate and Rhythm: Normal rate and regular rhythm.   Pulmonary:      Effort: Pulmonary effort is normal.   Abdominal:      General: Bowel sounds are normal.      Palpations: Abdomen is soft.   Musculoskeletal:         General: No deformity.      Cervical back: Normal range of motion and neck supple.   Lymphadenopathy:      Cervical: No cervical adenopathy.   Skin:     General: Skin is warm and dry.   Neurological:      Mental Status: He is alert and oriented to person, place, and time.   Psychiatric:         Behavior: Behavior normal.         Thought Content: Thought content normal.         Judgment: Judgment normal.         Class 3 Severe Obesity (BMI >=40). Obesity-related health conditions include the following: hypertension. Obesity is newly identified. BMI is is above average; BMI management plan is completed. We discussed low calorie, low carb based diet program, portion control and increasing exercise.      Results Reviewed:  No results found for: GLUCOSE, BUN, CREATININE, NA, K, CL, CO2, CALCIUM, ALT, AST, WBC, HCT, PLT, CHOL, TRIG, HDL, LDL, LDLHDL, HGBA1C      Assessment / Plan     Assessment/Plan:  1. Essential " hypertension  - olmesartan (Benicar) 20 MG tablet; Take 1 tablet by mouth Daily.  Dispense: 30 tablet; Refill: 6    Patient offered and declined influenza vaccine today. He was prescribed Benicar and instructed to take 1 pill daily for hypertension. He verbalizes understanding of the plan. If his blood pressure is low with taking 1 tablet a day, he was instructed to take half of a pill a day or abstain from taking it for a couple of days. The patient will monitor his blood pressure at home so that we can establish a trend.    He will return in 2 weeks with an updated list of his blood pressures.    Return in about 2 weeks (around 12/19/2022) for Recheck. unless patient needs to be seen sooner or acute issues arise.      I have discussed the patient results/orders and and plan/recommendation with them at today's visit.      Transcribed from ambient dictation for COURT Schultz by Iveth Oliva.  12/05/22   16:55 CST    Patient or patient representative verbalized consent to the visit recording.      Answers for HPI/ROS submitted by the patient on 12/2/2022  What is the primary reason for your visit?: High Blood Pressure

## 2022-12-15 ENCOUNTER — TELEPHONE (OUTPATIENT)
Dept: INTERNAL MEDICINE | Facility: CLINIC | Age: 27
End: 2022-12-15

## 2022-12-15 RX ORDER — CEFDINIR 300 MG/1
300 CAPSULE ORAL 2 TIMES DAILY
Qty: 14 CAPSULE | Refills: 0 | Status: SHIPPED | OUTPATIENT
Start: 2022-12-15

## 2022-12-15 NOTE — TELEPHONE ENCOUNTER
Caller: Adolfo Monge Jr.    Relationship: Self    Best call back number: [phone]    What medication are you requesting: abx  - not ear drops, this makes it worse typically and he said that he cannot take amoxicillin     What are your current symptoms: earache - no fever or anything else    If a prescription is needed, what is your preferred pharmacy and phone number: [facility] [address] [place] [address] [phone] [facility] [phone]      Additional notes: he is out of town, his wife is coming to visit him and will  meds for him at pharmacy. He will be back in town to make his appt on Monday

## 2022-12-19 ENCOUNTER — OFFICE VISIT (OUTPATIENT)
Dept: INTERNAL MEDICINE | Facility: CLINIC | Age: 27
End: 2022-12-19

## 2022-12-19 VITALS
WEIGHT: 306 LBS | OXYGEN SATURATION: 98 % | DIASTOLIC BLOOD PRESSURE: 100 MMHG | RESPIRATION RATE: 17 BRPM | BODY MASS INDEX: 40.56 KG/M2 | HEIGHT: 73 IN | HEART RATE: 74 BPM | SYSTOLIC BLOOD PRESSURE: 168 MMHG | TEMPERATURE: 99.3 F

## 2022-12-19 DIAGNOSIS — H65.113 ACUTE ALLERGIC OTITIS MEDIA OF BOTH EARS, RECURRENCE NOT SPECIFIED: Primary | ICD-10-CM

## 2022-12-19 DIAGNOSIS — I10 ESSENTIAL HYPERTENSION: ICD-10-CM

## 2022-12-19 DIAGNOSIS — H57.9 ALLERGIC EYE REACTION: ICD-10-CM

## 2022-12-19 PROCEDURE — 99214 OFFICE O/P EST MOD 30 MIN: CPT | Performed by: NURSE PRACTITIONER

## 2022-12-19 RX ORDER — METHYLPREDNISOLONE 4 MG/1
TABLET ORAL
Qty: 21 TABLET | Refills: 0 | Status: SHIPPED | OUTPATIENT
Start: 2022-12-19

## 2022-12-19 RX ORDER — PREDNISOLONE SODIUM PHOSPHATE 10 MG/ML
1 SOLUTION/ DROPS OPHTHALMIC 4 TIMES DAILY
Qty: 10 ML | Refills: 0 | Status: SHIPPED | OUTPATIENT
Start: 2022-12-19

## 2022-12-19 NOTE — PROGRESS NOTES
Subjective     Chief Complaint   Patient presents with   • Hypertension   • Ear Fullness       Hypertension  The current episode started more than 1 year ago. The problem has been gradually improving since onset. The problem is uncontrolled. Pertinent negatives include no anxiety, blurred vision, chest pain, headaches, malaise/fatigue, neck pain, orthopnea, palpitations, peripheral edema, PND, shortness of breath or sweats. There are no associated agents to hypertension. Risk factors for coronary artery disease include smoking/tobacco exposure and stress. There are no compliance problems.      The patient presents today for a follow-up on hypertension.    The patient states that he has not taken his Benicar today. He reports that his blood pressure at home 3 to 4 hours after taking his Benicar is usually 135 over 85 to 90. He states that his blood pressure is usually higher in the morning with readings around 160 or higher.    The patient reports that he has been experiencing right ear pain for approximately 1 day. He states that his ear was draining, but it is not anymore. He reports that he can not hear out of his right ear. He states he has issues with his ears and eyes every winter. He denies drainage from his eyes.  He denies fever.    Patient's PMR from outside medical facility reviewed and noted.    Review of Systems   Constitutional: Negative for malaise/fatigue.   Eyes: Negative for blurred vision.   Respiratory: Negative for shortness of breath.    Cardiovascular: Negative for chest pain, palpitations, orthopnea and PND.   Musculoskeletal: Negative for neck pain.   Neurological: Negative for headaches.        Otherwise complete ROS reviewed and negative except as mentioned in the HPI.    Past Medical History:   Past Medical History:   Diagnosis Date   • Ear ache    • Hypertension      Past Surgical History:  Past Surgical History:   Procedure Laterality Date   • EAR TUBES     • TONSILLECTOMY    "    Social History:  reports that he has never smoked. His smokeless tobacco use includes snuff. He reports that he does not drink alcohol and does not use drugs.    Family History: family history includes Diabetes in his paternal grandmother and paternal uncle; Heart attack in his maternal uncle and paternal uncle; Hypertension in his brother, father, and mother.       Allergies:  No Known Allergies  Medications:  Prior to Admission medications    Medication Sig Start Date End Date Taking? Authorizing Provider   amLODIPine (Norvasc) 10 MG tablet Take 1 tablet by mouth Daily. 12/8/20   Sasha Neville DO   cefdinir (OMNICEF) 300 MG capsule Take 1 capsule by mouth 2 (Two) Times a Day. 12/15/22   Henny Lanad APRN   fluticasone (Flonase) 50 MCG/ACT nasal spray 2 sprays into the nostril(s) as directed by provider Daily. 12/8/20   Sasha Neville DO   loratadine (Claritin) 10 MG tablet Take 1 tablet by mouth Daily. 12/8/20   Sasha Neville DO   methylPREDNISolone (MEDROL) 4 MG dose pack Take as directed on package instructions. 12/8/20   Sasha Neville DO   neomycin-polymyxin-hydrocortisone (CORTISPORIN) 3.5-35827-6 otic solution Administer 3 drops to the right ear 4 (Four) Times a Day. 11/9/20   Henny Landa APRN   olmesartan (Benicar) 20 MG tablet Take 1 tablet by mouth Daily. 12/5/22   Henny Landa APRN       Objective     Vital Signs: /100   Pulse 74   Temp 99.3 °F (37.4 °C)   Resp 17   Ht 185.4 cm (73\")   Wt (!) 139 kg (306 lb)   SpO2 98%   BMI 40.37 kg/m²   Physical Exam  Constitutional:       Appearance: He is well-developed.   HENT:      Head: Normocephalic and atraumatic.      Ears:      Comments: Bilateral acute otitis media.  Eyes:      Conjunctiva/sclera: Conjunctivae normal.      Pupils: Pupils are equal, round, and reactive to light.      Comments: Bilateral eyes are bloodshot with red conjunctiva.    Neck:      Vascular: No JVD. "   Cardiovascular:      Rate and Rhythm: Normal rate and regular rhythm.      Heart sounds: Normal heart sounds. No murmur heard.    No friction rub. No gallop.   Pulmonary:      Effort: Pulmonary effort is normal. No respiratory distress.      Breath sounds: Normal breath sounds. No wheezing or rales.   Chest:      Chest wall: No tenderness.   Abdominal:      General: Bowel sounds are normal. There is no distension.      Palpations: Abdomen is soft.      Tenderness: There is no abdominal tenderness. There is no guarding or rebound.   Musculoskeletal:         General: No tenderness or deformity. Normal range of motion.      Cervical back: Neck supple.   Skin:     General: Skin is warm and dry.      Findings: No rash.   Neurological:      Mental Status: He is alert and oriented to person, place, and time.      Cranial Nerves: No cranial nerve deficit.      Motor: No abnormal muscle tone.      Deep Tendon Reflexes: Reflexes normal.   Psychiatric:         Behavior: Behavior normal.         Thought Content: Thought content normal.         Judgment: Judgment normal.       Results Reviewed:  No results found for: GLUCOSE, BUN, CREATININE, NA, K, CL, CO2, CALCIUM, ALT, AST, WBC, HCT, PLT, CHOL, TRIG, HDL, LDL, LDLHDL, HGBA1C      Assessment / Plan     Assessment/Plan:  Diagnoses and all orders for this visit:    1. Acute allergic otitis media of both ears, recurrence not specified (Primary)  -     prednisoLONE sodium phosphate (INFLAMASE FORTE) 1 % ophthalmic solution; Administer 1 drop to both eyes 4 (Four) Times a Day.  Dispense: 10 mL; Refill: 0  -     methylPREDNISolone (MEDROL) 4 MG dose pack; Take as directed on package instructions.  Dispense: 21 tablet; Refill: 0    2. Allergic eye reaction  -     prednisoLONE sodium phosphate (INFLAMASE FORTE) 1 % ophthalmic solution; Administer 1 drop to both eyes 4 (Four) Times a Day.  Dispense: 10 mL; Refill: 0    3. Essential hypertension        - The patient was advised to  take Benicar 20 mg twice daily.      Return in about 1 month (around 1/19/2023). unless patient needs to be seen sooner or acute issues arise.    Code Status: Full.     I have discussed the patient results/orders and and plan/recommendation with them at today's visit.      COURT Schultz   12/19/2022    Transcribed from ambient dictation for COURT Schultz by Abebe Guillen.  12/19/22   19:01 CST    Patient or patient representative verbalized consent to the visit recording.  I have personally performed the services described in this document as transcribed by the above individual, and it is both accurate and complete.  COURT Schultz  12/23/2022  18:44 CST

## 2022-12-27 DIAGNOSIS — I10 ESSENTIAL HYPERTENSION: ICD-10-CM

## 2022-12-27 RX ORDER — OLMESARTAN MEDOXOMIL 20 MG/1
20 TABLET ORAL DAILY
Qty: 30 TABLET | Refills: 6 | Status: SHIPPED | OUTPATIENT
Start: 2022-12-27 | End: 2023-01-16 | Stop reason: SDUPTHER

## 2023-01-16 ENCOUNTER — TELEPHONE (OUTPATIENT)
Dept: INTERNAL MEDICINE | Facility: CLINIC | Age: 28
End: 2023-01-16
Payer: COMMERCIAL

## 2023-01-16 DIAGNOSIS — I10 ESSENTIAL HYPERTENSION: ICD-10-CM

## 2023-01-16 NOTE — TELEPHONE ENCOUNTER
Caller: Adolfo Monge Jr.    Relationship: Self    Best call back number: [phone]    Requested Prescriptions:   Requested Prescriptions     Pending Prescriptions Disp Refills   • olmesartan (Benicar) 20 MG tablet 30 tablet 6     Sig: Take 1 tablet by mouth Daily.        Pharmacy where request should be sent: [facility]/PHARMACY #[id number] - [address] [phone] [facility] [phone] [facility]     Does the patient have less than a 3 day supply:  [x] Yes  [] No    Would you like a call back once the refill request has been completed: [x] Yes [] No    If the office needs to give you a call back, can they leave a voicemail: [x] Yes [] No    Conchis Luz Rep   01/16/23 16:55 CST

## 2023-01-16 NOTE — TELEPHONE ENCOUNTER
Rx Refill Note  Requested Prescriptions     Pending Prescriptions Disp Refills   • olmesartan (Benicar) 20 MG tablet 30 tablet 6     Sig: Take 1 tablet by mouth Daily.      Last office visit with prescribing clinician: 12/19/2022      Next office visit with prescribing clinician: 1/24/2023         DATE OF LAST REFILL: 12-             Kelley Pratt CMA  01/16/23, 17:05 CST

## 2023-01-17 RX ORDER — OLMESARTAN MEDOXOMIL 20 MG/1
20 TABLET ORAL 2 TIMES DAILY
Qty: 60 TABLET | Refills: 6 | Status: SHIPPED | OUTPATIENT
Start: 2023-01-17 | End: 2023-01-19 | Stop reason: SDUPTHER

## 2023-01-19 DIAGNOSIS — I10 ESSENTIAL HYPERTENSION: ICD-10-CM

## 2023-01-19 RX ORDER — OLMESARTAN MEDOXOMIL 40 MG/1
40 TABLET ORAL DAILY
Qty: 30 TABLET | Refills: 2 | Status: SHIPPED | OUTPATIENT
Start: 2023-01-19

## 2023-01-24 ENCOUNTER — OFFICE VISIT (OUTPATIENT)
Dept: INTERNAL MEDICINE | Facility: CLINIC | Age: 28
End: 2023-01-24
Payer: COMMERCIAL

## 2023-01-24 VITALS
DIASTOLIC BLOOD PRESSURE: 94 MMHG | RESPIRATION RATE: 17 BRPM | WEIGHT: 306 LBS | OXYGEN SATURATION: 98 % | BODY MASS INDEX: 40.56 KG/M2 | HEART RATE: 104 BPM | HEIGHT: 73 IN | TEMPERATURE: 98.7 F | SYSTOLIC BLOOD PRESSURE: 168 MMHG

## 2023-01-24 DIAGNOSIS — I10 ESSENTIAL HYPERTENSION: Primary | ICD-10-CM

## 2023-01-24 PROCEDURE — 99214 OFFICE O/P EST MOD 30 MIN: CPT | Performed by: NURSE PRACTITIONER

## 2023-01-24 RX ORDER — METOPROLOL SUCCINATE 25 MG/1
25 TABLET, EXTENDED RELEASE ORAL DAILY
Qty: 30 TABLET | Refills: 6 | Status: SHIPPED | OUTPATIENT
Start: 2023-01-24 | End: 2023-01-24

## 2023-01-24 RX ORDER — AMLODIPINE BESYLATE 10 MG/1
10 TABLET ORAL DAILY
Qty: 90 TABLET | Refills: 1 | Status: SHIPPED | OUTPATIENT
Start: 2023-01-24

## 2023-01-24 NOTE — PROGRESS NOTES
Subjective     Chief Complaint   Patient presents with   • Hypertension       History of Present Illness  The patient presents today for a follow-up on his blood pressure.    The patient states that he has not checked his blood pressure at home approximately 4 days. He states that prior to that, his blood pressure was 135/97 mmHg. His blood pressure today is 168/94 mmHg. He states that he has been taking his blood pressure medications regularly. He states that he has been on his blood medications for 2 months. He states that his blood pressure medication was increased. He states that he was taking 20 mg 1 tablet in the morning. He states that he is currently taking 1 tablet in the morning and 1 tablet in the evening. He reports that he has been under a significant amount of stress lately because of his job. He states that stress has increased since his last physical, which was 9-months ago. He denies any other health problems. He has a family history of hypertension. He admits to having a blood pressure monitor at home.    The patient states that he had labs done approximately 9-months ago when he had his last physical for his job.    He denies tobacco use. He states that he only dips tobacco. He denies vaping.  Patient's PMR from outside medical facility reviewed and noted.    Review of Systems   No chest pain or shortness of breath.     Otherwise complete ROS reviewed and negative except as mentioned in the HPI.    Past Medical History:   Past Medical History:   Diagnosis Date   • Ear ache    • Hypertension      Past Surgical History:  Past Surgical History:   Procedure Laterality Date   • EAR TUBES     • TONSILLECTOMY       Social History:  reports that he has never smoked. His smokeless tobacco use includes snuff. He reports that he does not drink alcohol and does not use drugs.    Family History: family history includes Diabetes in his paternal grandmother and paternal uncle; Heart attack in his  "maternal uncle and paternal uncle; Hypertension in his brother, father, and mother.      Allergies:  No Known Allergies  Medications:  Prior to Admission medications    Medication Sig Start Date End Date Taking? Authorizing Provider   amLODIPine (Norvasc) 10 MG tablet Take 1 tablet by mouth Daily. 12/8/20   Sasha Neville DO   cefdinir (OMNICEF) 300 MG capsule Take 1 capsule by mouth 2 (Two) Times a Day. 12/15/22   Henny Landa APRN   fluticasone (Flonase) 50 MCG/ACT nasal spray 2 sprays into the nostril(s) as directed by provider Daily. 12/8/20   Sasha Neville DO   loratadine (Claritin) 10 MG tablet Take 1 tablet by mouth Daily. 12/8/20   Sasha Neville DO   methylPREDNISolone (MEDROL) 4 MG dose pack Take as directed on package instructions. 12/8/20   Sasha Neville DO   methylPREDNISolone (MEDROL) 4 MG dose pack Take as directed on package instructions. 12/19/22   Henny Landa APRN   neomycin-polymyxin-hydrocortisone (CORTISPORIN) 3.5-97169-3 otic solution Administer 3 drops to the right ear 4 (Four) Times a Day. 11/9/20   Henny Landa APRN   olmesartan (BENICAR) 40 MG tablet Take 1 tablet by mouth Daily. 1/19/23   Henny Landa APRN   prednisoLONE sodium phosphate (INFLAMASE FORTE) 1 % ophthalmic solution Administer 1 drop to both eyes 4 (Four) Times a Day. 12/19/22   Henny Landa APRN       Objective     Vital Signs: /94   Pulse 104   Temp 98.7 °F (37.1 °C)   Resp 17   Ht 185.4 cm (73\")   Wt (!) 139 kg (306 lb)   SpO2 98%   BMI 40.37 kg/m²   Physical Exam  Vitals reviewed.   Constitutional:       Appearance: He is well-developed. He is obese.   HENT:      Head: Normocephalic and atraumatic.   Eyes:      Pupils: Pupils are equal, round, and reactive to light.   Neck:      Vascular: No JVD.   Cardiovascular:      Rate and Rhythm: Normal rate and regular rhythm.   Pulmonary:      Effort: Pulmonary effort is normal. "   Abdominal:      General: Bowel sounds are normal.      Palpations: Abdomen is soft.      Comments: Protuberant.    Musculoskeletal:         General: No deformity.      Cervical back: Normal range of motion and neck supple.   Lymphadenopathy:      Cervical: No cervical adenopathy.   Skin:     General: Skin is warm and dry.   Neurological:      Mental Status: He is alert and oriented to person, place, and time.   Psychiatric:         Behavior: Behavior normal.         Thought Content: Thought content normal.         Judgment: Judgment normal.         Class 3 Severe Obesity (BMI >=40). Obesity-related health conditions include the following: none. Obesity is unchanged. BMI is is above average; BMI management plan is completed. We discussed low calorie, low carb based diet program, portion control and increasing exercise.      Results Reviewed:  No results found for: GLUCOSE, BUN, CREATININE, NA, K, CL, CO2, CALCIUM, ALT, AST, WBC, HCT, PLT, CHOL, TRIG, HDL, LDL, LDLHDL, HGBA1C      Assessment / Plan     Assessment/Plan:  Diagnoses and all orders for this visit:    1. Essential hypertension (Primary)  -     Discontinue: metoprolol succinate XL (Toprol XL) 25 MG 24 hr tablet; Take 1 tablet by mouth Daily.  Dispense: 30 tablet; Refill: 6  -     amLODIPine (Norvasc) 10 MG tablet; Take 1 tablet by mouth Daily.  Dispense: 90 tablet; Refill: 1       - we rechecked the patient's blood pressure in the office.  - The patient was advised to monitor his blood pressure at home twice daily.  - He will record his pulse and blood pressure readings.      Patient or patient representative verbalized consent to the visit recording.  I have personally performed the services described in this document as transcribed by the above individual, and it is both accurate and complete.  Henny Landa, APRN  1/26/2023  13:16 CST      Return in about 1 month (around 2/24/2023). unless patient needs to be seen sooner or acute issues  arise.    Code Status: Full.     I have discussed the patient results/orders and and plan/recommendation with them at today's visit.      Henny Landa, APRN   01/24/2023

## 2023-05-13 DIAGNOSIS — I10 ESSENTIAL HYPERTENSION: ICD-10-CM

## 2023-05-15 RX ORDER — OLMESARTAN MEDOXOMIL 40 MG/1
TABLET ORAL
Qty: 30 TABLET | Refills: 2 | Status: SHIPPED | OUTPATIENT
Start: 2023-05-15

## 2023-05-15 NOTE — TELEPHONE ENCOUNTER
Rx Refill Note  Requested Prescriptions     Pending Prescriptions Disp Refills   • olmesartan (BENICAR) 40 MG tablet [Pharmacy Med Name: OLMESARTAN MEDOXOMIL 40 MG TAB] 30 tablet 2     Sig: TAKE 1 TABLET BY MOUTH EVERY DAY      Last office visit with prescribing clinician: 1/24/2023   Next office visit with prescribing clinician: Visit date not found                         Would you like a call back once the refill request has been completed: [] Yes [] No    If the office needs to give you a call back, can they leave a voicemail: [] Yes [] No    Cally Lockhart RN  05/15/23, 07:26 CDT

## 2023-10-05 DIAGNOSIS — I10 ESSENTIAL HYPERTENSION: ICD-10-CM

## 2023-10-05 RX ORDER — OLMESARTAN MEDOXOMIL 40 MG/1
40 TABLET ORAL DAILY
Qty: 30 TABLET | Refills: 2 | Status: SHIPPED | OUTPATIENT
Start: 2023-10-05

## 2023-10-05 NOTE — TELEPHONE ENCOUNTER
Rx Refill Note  Requested Prescriptions     Pending Prescriptions Disp Refills    olmesartan (BENICAR) 40 MG tablet 30 tablet 2     Sig: Take 1 tablet by mouth Daily.      Last office visit with prescribing clinician: 1/24/2023   Last telemedicine visit with prescribing clinician: Visit date not found   Next office visit with prescribing clinician: Visit date not found                         Would you like a call back once the refill request has been completed: [] Yes [] No    If the office needs to give you a call back, can they leave a voicemail: [] Yes [] No    Ya Alcaraz MA  10/05/23, 17:11 CDT

## 2023-10-08 DIAGNOSIS — I10 ESSENTIAL HYPERTENSION: ICD-10-CM

## 2023-10-09 RX ORDER — OLMESARTAN MEDOXOMIL 40 MG/1
40 TABLET ORAL DAILY
Qty: 30 TABLET | Refills: 2 | OUTPATIENT
Start: 2023-10-09

## 2023-11-03 DIAGNOSIS — I10 ESSENTIAL HYPERTENSION: ICD-10-CM

## 2023-11-03 RX ORDER — AMLODIPINE BESYLATE 10 MG/1
10 TABLET ORAL DAILY
Qty: 90 TABLET | Refills: 0 | Status: SHIPPED | OUTPATIENT
Start: 2023-11-03

## 2023-11-03 NOTE — TELEPHONE ENCOUNTER
Rx Refill Note  Requested Prescriptions     Pending Prescriptions Disp Refills    amLODIPine (NORVASC) 10 MG tablet [Pharmacy Med Name: amLODIPine Besylate 10 MG Oral Tablet] 90 tablet 0     Sig: Take 1 tablet by mouth once daily      Last office visit with prescribing clinician: 1/24/2023   Next office visit with prescribing clinician: Visit date not found                         Would you like a call back once the refill request has been completed: [] Yes [] No    If the office needs to give you a call back, can they leave a voicemail: [] Yes [] No    Cally Lockhart RN  11/03/23, 10:42 CDT

## 2023-11-10 ENCOUNTER — TELEPHONE (OUTPATIENT)
Dept: INTERNAL MEDICINE | Facility: CLINIC | Age: 28
End: 2023-11-10
Payer: COMMERCIAL

## 2023-11-10 DIAGNOSIS — I10 ESSENTIAL HYPERTENSION: ICD-10-CM

## 2023-11-10 RX ORDER — OLMESARTAN MEDOXOMIL 40 MG/1
40 TABLET ORAL DAILY
Qty: 30 TABLET | Refills: 2 | OUTPATIENT
Start: 2023-11-10

## 2023-11-10 NOTE — TELEPHONE ENCOUNTER
PT called requesting BP meds, told him it was January we last saw him and got him in with Christa on Monday.

## 2023-12-18 DIAGNOSIS — I10 ESSENTIAL HYPERTENSION: ICD-10-CM

## 2023-12-18 RX ORDER — OLMESARTAN MEDOXOMIL 20 MG/1
20 TABLET ORAL DAILY
Qty: 30 TABLET | Refills: 0 | OUTPATIENT
Start: 2023-12-18

## 2024-01-04 ENCOUNTER — OFFICE VISIT (OUTPATIENT)
Dept: INTERNAL MEDICINE | Facility: CLINIC | Age: 29
End: 2024-01-04
Payer: COMMERCIAL

## 2024-01-04 VITALS
HEART RATE: 85 BPM | BODY MASS INDEX: 39.23 KG/M2 | OXYGEN SATURATION: 98 % | DIASTOLIC BLOOD PRESSURE: 91 MMHG | WEIGHT: 296 LBS | HEIGHT: 73 IN | TEMPERATURE: 97 F | SYSTOLIC BLOOD PRESSURE: 138 MMHG

## 2024-01-04 DIAGNOSIS — I10 ESSENTIAL HYPERTENSION: ICD-10-CM

## 2024-01-04 DIAGNOSIS — Z11.59 ENCOUNTER FOR HEPATITIS C SCREENING TEST FOR LOW RISK PATIENT: ICD-10-CM

## 2024-01-04 DIAGNOSIS — Z00.00 ANNUAL PHYSICAL EXAM: Primary | ICD-10-CM

## 2024-01-04 PROCEDURE — 99395 PREV VISIT EST AGE 18-39: CPT | Performed by: FAMILY MEDICINE

## 2024-01-04 RX ORDER — OLMESARTAN MEDOXOMIL 40 MG/1
40 TABLET ORAL DAILY
Qty: 90 TABLET | Refills: 3 | Status: SHIPPED | OUTPATIENT
Start: 2024-01-04

## 2024-01-04 RX ORDER — AMLODIPINE BESYLATE 10 MG/1
10 TABLET ORAL DAILY
Qty: 90 TABLET | Refills: 3 | Status: SHIPPED | OUTPATIENT
Start: 2024-01-04

## 2024-01-04 NOTE — PROGRESS NOTES
"Chief Complaint  Hypertension    Subjective        Adolfo Monge Jr. presents to [facility]  History of Present Illness      Adolfo Monge Jr. is a 28 y.o. male who is here for this annual wellness visit.  Routine screening for blood pressure, obesity, depression, alcohol use use and STDs performed at this visit.  Patient patient offered routine vaccinations at this time.    Patient reminded of routine and screening labs including CBC CMP and a lipid profile    Review of Nutrition:  Balanced diet? yes    I discussed at preventative health care and cancer screening with him and its role in reducing types of cancer and other health problems appropriate for the patient's age.  Also discussed vaccines and current status with the patient.  Patient understands the risks and benefits of screening and is currently up-to-date with current recommendations that he chosses.     Objective   Vital Signs:  /91 (BP Location: Left arm, Patient Position: Sitting, Cuff Size: Adult)   Pulse 85   Temp 97 °F (36.1 °C) (Infrared)   Ht 185.4 cm (73\")   Wt 134 kg (296 lb)   SpO2 98%   BMI 39.05 kg/m²   Estimated body mass index is 39.05 kg/m² as calculated from the following:    Height as of this encounter: 185.4 cm (73\").    Weight as of this encounter: 134 kg (296 lb).       Past Medical History:   Diagnosis Date    Ear ache     Hypertension        Past Surgical History:   Procedure Laterality Date    EAR TUBES      TONSILLECTOMY         Social History     Tobacco Use    Smoking status: Never    Smokeless tobacco: Current     Types: Snuff   Vaping Use    Vaping Use: Never used   Substance Use Topics    Alcohol use: No    Drug use: No       Family History   Problem Relation Age of Onset    Hypertension Mother     Hypertension Father     Hypertension Brother     Heart attack Maternal Uncle     Diabetes Paternal Uncle     Heart attack Paternal Uncle     Diabetes Paternal Grandmother  "       Allergies as of 01/04/2024    (No Known Allergies)         Current Outpatient Medications:     amLODIPine (NORVASC) 10 MG tablet, Take 1 tablet by mouth Daily., Disp: 90 tablet, Rfl: 3    olmesartan (BENICAR) 40 MG tablet, Take 1 tablet by mouth Daily., Disp: 90 tablet, Rfl: 3    Review of systems   negative unless otherwise specified above in HPI        Physical Exam  Vitals and nursing note reviewed.   Constitutional:       General: He is not in acute distress.     Appearance: Normal appearance.   HENT:      Head: Normocephalic.   Eyes:      Extraocular Movements: Extraocular movements intact.      Pupils: Pupils are equal, round, and reactive to light.   Cardiovascular:      Rate and Rhythm: Normal rate and regular rhythm.      Heart sounds: Normal heart sounds. No murmur heard.  Pulmonary:      Effort: Pulmonary effort is normal. No respiratory distress.      Breath sounds: Normal breath sounds. No rhonchi or rales.   Abdominal:      General: Abdomen is flat. Bowel sounds are normal.      Palpations: Abdomen is soft.   Neurological:      General: No focal deficit present.      Mental Status: He is alert.        Result Review :          PHQ-9 Total Score: 0            Assessment and Plan   Diagnoses and all orders for this visit:    1. Annual physical exam (Primary)  -     Comprehensive Metabolic Panel; Future  -     CBC & Differential; Future  -     Lipid Panel; Future    2. Encounter for hepatitis C screening test for low risk patient  -     Hepatitis C Antibody; Future    3. Essential hypertension  -     amLODIPine (NORVASC) 10 MG tablet; Take 1 tablet by mouth Daily.  Dispense: 90 tablet; Refill: 3  -     olmesartan (BENICAR) 40 MG tablet; Take 1 tablet by mouth Daily.  Dispense: 90 tablet; Refill: 3        EMR Dictation/Transcription disclaimer:   Some of this note may be an electronic transcription/translation of spoken language to printed text. The electronic translation of spoken language may permit  erroneous, or at times, nonsensical words or phrases to be inadvertently transcribed; Although I have reviewed the note for such errors, some may still exist.           Follow Up   Return in about 1 year (around 1/4/2025) for Annual physical.  Patient was given instructions and counseling regarding his condition or for health maintenance advice. Please see specific information pulled into the AVS if appropriate.     Signed by:    Bolivar Welch MD Date: 01/04/24

## 2024-11-08 ENCOUNTER — TELEPHONE (OUTPATIENT)
Dept: INTERNAL MEDICINE | Facility: CLINIC | Age: 29
End: 2024-11-08
Payer: COMMERCIAL

## 2024-11-08 RX ORDER — ONDANSETRON 4 MG/1
4 TABLET, ORALLY DISINTEGRATING ORAL EVERY 8 HOURS PRN
Qty: 30 TABLET | Refills: 0 | Status: SHIPPED | OUTPATIENT
Start: 2024-11-08

## 2024-11-08 NOTE — TELEPHONE ENCOUNTER
You have not seen this pt since Jan 2023 but Dr Welch has seen pt Jan 2024 and he is sched to see you Jan 2025.  Can you send in med or does pt need appt?

## 2024-11-08 NOTE — TELEPHONE ENCOUNTER
Called patient. He voiced understanding and had no questions. He will let us know if symptoms persist.

## 2024-11-08 NOTE — TELEPHONE ENCOUNTER
Caller: SHANEL HERNANDEZ    Relationship: Emergency Contact    Best call back number: [phone]     What medication are you requesting: SOMETHING NAUSEA AND DIARRHEA    What are your current symptoms: DIARRHEA AND VOMITING    How long have you been experiencing symptoms: SINCE LAST NIGHT 11.07.2024    Have you had these symptoms before:    [x] Yes  [] No    Have you been treated for these symptoms before:   [x] Yes  [] No    If a prescription is needed, what is your preferred pharmacy and phone number: [facility] [address] [place] [address] [phone] [facility] [phone]      Additional notes: PATIENT'S WIFE STATED HE GOT HOME FROM WORK LAST NIGHT NOT FEEL WELL AND CALLED IN TODAY DUE TO HIS SYMPTOMS.     PLEASE CALL WHEN COMPLETE.

## 2025-01-09 ENCOUNTER — OFFICE VISIT (OUTPATIENT)
Dept: INTERNAL MEDICINE | Facility: CLINIC | Age: 30
End: 2025-01-09
Payer: COMMERCIAL

## 2025-01-09 VITALS
HEIGHT: 73 IN | BODY MASS INDEX: 38.43 KG/M2 | DIASTOLIC BLOOD PRESSURE: 82 MMHG | OXYGEN SATURATION: 99 % | WEIGHT: 290 LBS | TEMPERATURE: 98.9 F | SYSTOLIC BLOOD PRESSURE: 138 MMHG | HEART RATE: 89 BPM

## 2025-01-09 DIAGNOSIS — I10 ESSENTIAL HYPERTENSION: ICD-10-CM

## 2025-01-09 PROCEDURE — 99213 OFFICE O/P EST LOW 20 MIN: CPT | Performed by: NURSE PRACTITIONER

## 2025-01-09 RX ORDER — LISINOPRIL 10 MG/1
1 TABLET ORAL DAILY
COMMUNITY
End: 2025-01-09

## 2025-01-09 RX ORDER — OLMESARTAN MEDOXOMIL 40 MG/1
40 TABLET ORAL DAILY
Qty: 90 TABLET | Refills: 3 | Status: SHIPPED | OUTPATIENT
Start: 2025-01-09

## 2025-01-09 RX ORDER — AMLODIPINE BESYLATE 10 MG/1
10 TABLET ORAL DAILY
Qty: 90 TABLET | Refills: 3 | Status: SHIPPED | OUTPATIENT
Start: 2025-01-09

## 2025-01-09 NOTE — PROGRESS NOTES
Subjective     Chief Complaint   Patient presents with    Hypertension       History of Present Illness  The patient is a 29-year-old male who presents for evaluation of hypertension.    He reports satisfactory control of his blood pressure, managed with olmesartan 40 mg and amlodipine 10 mg. He has not been monitoring his blood pressure at home due to his demanding work schedule, which often extends from 12 to 18 hours daily. His last visit to this clinic was over a year ago. He was previously on lisinopril but discontinued it due to adverse effects. He does not experience any chest pain or shortness of breath. His appetite and hydration are normal, and he reports no issues with urination or bowel movements. He also reports no peripheral edema. His medications were recently refilled.    SOCIAL HISTORY  He works at the [employer].    MEDICATIONS  Olmesartan, amlodipine.  Discontinued: Lisinopril.      Otherwise complete ROS reviewed and negative except as mentioned in the HPI.    Past Medical History:   Past Medical History:   Diagnosis Date    Ear ache     Hypertension      Past Surgical History:  Past Surgical History:   Procedure Laterality Date    EAR TUBES      TONSILLECTOMY       Social History:  reports that he has never smoked. His smokeless tobacco use includes snuff. He reports that he does not drink alcohol and does not use drugs.    Family History: family history includes Diabetes in his paternal grandmother and paternal uncle; Heart attack in his maternal uncle and paternal uncle; Hypertension in his brother, father, and mother.       Allergies:  No Known Allergies  Medications:  Prior to Admission medications    Medication Sig Start Date End Date Taking? Authorizing Provider   amLODIPine (NORVASC) 10 MG tablet Take 1 tablet by mouth Daily. 1/4/24  Yes Bolivar Welch MD   lisinopril (PRINIVIL,ZESTRIL) 10 MG tablet Take 1 tablet by mouth Daily.   Yes ProviderLoretta MD  "  olmesartan (BENICAR) 40 MG tablet Take 1 tablet by mouth Daily. 1/4/24  Yes Bolivar Welch MD   ondansetron ODT (ZOFRAN-ODT) 4 MG disintegrating tablet Place 1 tablet on the tongue Every 8 (Eight) Hours As Needed for Nausea or Vomiting.  Patient not taking: Reported on 1/9/2025 11/8/24   Henny Landa APRN       Objective     Vital Signs: /82 (BP Location: Left arm, Patient Position: Sitting, Cuff Size: Large Adult)   Pulse 89   Temp 98.9 °F (37.2 °C) (Temporal)   Ht 185.4 cm (72.99\")   Wt 132 kg (290 lb)   SpO2 99%   BMI 38.27 kg/m²     Physical Exam  Vital Signs  Blood pressure reading is 138/82.  Physical Exam  Vitals reviewed.   Constitutional:       Appearance: He is well-developed. He is obese.   HENT:      Head: Normocephalic and atraumatic.   Eyes:      Pupils: Pupils are equal, round, and reactive to light.   Neck:      Vascular: No JVD.   Cardiovascular:      Rate and Rhythm: Normal rate and regular rhythm.   Pulmonary:      Effort: Pulmonary effort is normal.      Breath sounds: Normal breath sounds.   Abdominal:      General: Bowel sounds are normal.      Palpations: Abdomen is soft.   Musculoskeletal:         General: No deformity.      Cervical back: Normal range of motion and neck supple.   Lymphadenopathy:      Cervical: No cervical adenopathy.   Skin:     General: Skin is warm and dry.   Neurological:      Mental Status: He is alert and oriented to person, place, and time.   Psychiatric:         Behavior: Behavior normal.         Thought Content: Thought content normal.         Judgment: Judgment normal.       Class 2 Severe Obesity (BMI >=35 and <=39.9). Obesity-related health conditions include the following: hypertension. Obesity is newly identified. BMI is is above average; BMI management plan is completed. We discussed low calorie, low carb based diet program, portion control, and increasing exercise.      Results Reviewed:  No results found for: \"GLUCOSE\", " "\"BUN\", \"CREATININE\", \"NA\", \"K\", \"CL\", \"CO2\", \"CALCIUM\", \"ALT\", \"AST\", \"WBC\", \"HCT\", \"PLT\", \"CHOL\", \"TRIG\", \"HDL\", \"LDL\", \"LDLHDL\", \"HGBA1C\"    Results        Assessment / Plan     Assessment/Plan:  Diagnoses and all orders for this visit:    1. Essential hypertension  -     amLODIPine (NORVASC) 10 MG tablet; Take 1 tablet by mouth Daily.  Dispense: 90 tablet; Refill: 3  -     olmesartan (BENICAR) 40 MG tablet; Take 1 tablet by mouth Daily.  Dispense: 90 tablet; Refill: 3        Assessment & Plan  1. Hypertension.  His blood pressure today is 138/82. He is currently on olmesartan 40 mg and amlodipine 10 mg. He has not been monitoring his blood pressure at home due to his demanding work schedule. Prescriptions for olmesartan 40 mg and amlodipine 10 mg have been renewed and sent to [facility] in [place].    Follow-up  The patient will follow up on 03/01/2025 for a fasting physical examination and comprehensive blood work.    Return in about 8 weeks (around 3/3/2025) for Annual physical. unless patient needs to be seen sooner or acute issues arise.    Code Status: Full.     Patient or patient representative verbalized consent for the use of Ambient Listening during the visit with  COURT Schultz for chart documentation. 1/9/2025  08:22 CST    I have discussed the patient results/orders and and plan/recommendation with them at today's visit.      Signed by:    COURT Schultz Date: 01/09/25    "

## 2025-03-26 ENCOUNTER — OFFICE VISIT (OUTPATIENT)
Dept: INTERNAL MEDICINE | Facility: CLINIC | Age: 30
End: 2025-03-26
Payer: COMMERCIAL

## 2025-03-26 VITALS
WEIGHT: 308 LBS | TEMPERATURE: 98.6 F | HEIGHT: 73 IN | HEART RATE: 96 BPM | BODY MASS INDEX: 40.82 KG/M2 | DIASTOLIC BLOOD PRESSURE: 80 MMHG | SYSTOLIC BLOOD PRESSURE: 150 MMHG | OXYGEN SATURATION: 98 %

## 2025-03-26 DIAGNOSIS — E55.9 VITAMIN D DEFICIENCY: ICD-10-CM

## 2025-03-26 DIAGNOSIS — Z00.00 ROUTINE GENERAL MEDICAL EXAMINATION AT A HEALTH CARE FACILITY: ICD-10-CM

## 2025-03-26 DIAGNOSIS — Z11.59 ENCOUNTER FOR HEPATITIS C SCREENING TEST FOR LOW RISK PATIENT: Primary | ICD-10-CM

## 2025-03-26 DIAGNOSIS — R53.82 CHRONIC FATIGUE: ICD-10-CM

## 2025-03-26 DIAGNOSIS — R73.01 ELEVATED FASTING GLUCOSE: ICD-10-CM

## 2025-03-26 DIAGNOSIS — E53.8 VITAMIN B12 DEFICIENCY: ICD-10-CM

## 2025-03-26 DIAGNOSIS — I49.9 CARDIAC ARRHYTHMIA, UNSPECIFIED CARDIAC ARRHYTHMIA TYPE: ICD-10-CM

## 2025-03-26 NOTE — PROGRESS NOTES
Subjective     Chief Complaint   Patient presents with    Annual Exam       History of Present Illness  History of Present Illness  The patient presents for a physical exam.    He was initially diagnosed with hypertension several years ago, when he weighed 190 pounds. He has been managing his condition with nightly medication, which he reports as effective. He has experienced episodes of elevated blood pressure, with systolic readings nearing 200 and diastolic readings around 118, but reports no associated discomfort. He is not experiencing any chest pain or shortness of breath. He has a history of irregular heart rhythm and has undergone an EKG in the past. He has expressed interest in scheduling an echocardiogram for further evaluation. He recalls a previous trial of lisinopril, which was discontinued due to adverse effects on his kidneys, including hematuria.    He is due for a tetanus vaccine, however reports he had one 4 years ago after injuring hand.    SOCIAL HISTORY  He works at [employer].    FAMILY HISTORY  His father underwent open heart surgery 3 years ago and had to go back twice due to complications. His father's brother  at the age of 49 from heart failure. The oldest living member in his family is 52 years old and most of them die between 48 and 51 years of age due to heart disease.    MEDICATIONS  Discontinued: lisinopril    IMMUNIZATIONS  He received a tetanus shot due to a cut on his hand.    Patient's PMR from outside medical facility reviewed and noted.    Review of Systems     Otherwise complete ROS reviewed and negative except as mentioned in the HPI.    Past Medical History:   Past Medical History:   Diagnosis Date    Ear ache     Hypertension 2013     Past Surgical History:  Past Surgical History:   Procedure Laterality Date    EAR TUBES      TONSILLECTOMY       Social History:  reports that he has never smoked. His smokeless tobacco use includes snuff. He reports that he does  "not drink alcohol and does not use drugs.    Family History: family history includes Diabetes in his paternal grandmother and paternal uncle; Heart attack in his maternal uncle and paternal uncle; Hypertension in his brother, father, and mother.      Allergies:  No Known Allergies  Medications:  Prior to Admission medications    Medication Sig Start Date End Date Taking? Authorizing Provider   amLODIPine (NORVASC) 10 MG tablet Take 1 tablet by mouth Daily. 1/9/25  Yes Henny Landa APRN   olmesartan (BENICAR) 40 MG tablet Take 1 tablet by mouth Daily. 1/9/25  Yes Henny Landa APRN       DANO:        PHQ-9 Depression Screening  Little interest or pleasure in doing things?     Feeling down, depressed, or hopeless?     PHQ-2 Total Score     Trouble falling or staying asleep, or sleeping too much?     Feeling tired or having little energy?     Poor appetite or overeating?     Feeling bad about yourself - or that you are a failure or have let yourself or your family down?     Trouble concentrating on things, such as reading the newspaper or watching television?     Moving or speaking so slowly that other people could have noticed? Or the opposite - being so fidgety or restless that you have been moving around a lot more than usual?     Thoughts that you would be better off dead, or of hurting yourself in some way?     PHQ-9 Total Score     If you checked off any problems, how difficult have these problems made it for you to do your work, take care of things at home, or get along with other people?           Objective     Vital Signs: /85   Pulse 96   Temp 98.6 °F (37 °C)   Ht 185.4 cm (72.99\")   Wt (!) 140 kg (308 lb)   SpO2 98%   BMI 40.65 kg/m²   Physical Exam  Vitals and nursing note reviewed.   Constitutional:       General: He is not in acute distress.     Appearance: Normal appearance. He is not ill-appearing.   HENT:      Head: Normocephalic and atraumatic.      Right Ear: " "External ear normal.      Left Ear: External ear normal.      Nose: Nose normal.      Mouth/Throat:      Mouth: Mucous membranes are moist.      Pharynx: No posterior oropharyngeal erythema.   Eyes:      General: No scleral icterus.     Extraocular Movements: Extraocular movements intact.      Conjunctiva/sclera: Conjunctivae normal.      Pupils: Pupils are equal, round, and reactive to light.   Cardiovascular:      Rate and Rhythm: Normal rate. Rhythm irregular.      Pulses: Normal pulses.      Heart sounds: Normal heart sounds.   Pulmonary:      Effort: Pulmonary effort is normal. No respiratory distress.      Breath sounds: Normal breath sounds. No wheezing.   Abdominal:      General: Abdomen is flat. Bowel sounds are normal.      Palpations: Abdomen is soft.      Tenderness: There is no abdominal tenderness.   Musculoskeletal:         General: Normal range of motion.      Cervical back: Normal range of motion.      Right lower leg: No edema.      Left lower leg: No edema.   Skin:     General: Skin is warm and dry.      Findings: No erythema or rash.   Neurological:      General: No focal deficit present.      Mental Status: He is alert and oriented to person, place, and time. Mental status is at baseline.      Motor: No weakness.   Psychiatric:         Mood and Affect: Mood normal.         Behavior: Behavior normal.         Thought Content: Thought content normal.         Judgment: Judgment normal.                Advance Care Planning   ACP discussion was held with the patient during this visit.         Results Reviewed:  No results found for: \"GLUCOSE\", \"BUN\", \"CREATININE\", \"NA\", \"K\", \"CL\", \"CO2\", \"CALCIUM\", \"ALT\", \"AST\", \"WBC\", \"HCT\", \"PLT\", \"CHOL\", \"TRIG\", \"HDL\", \"LDL\", \"LDLHDL\", \"HGBA1C\"      Assessment / Plan     Assessment/Plan:    1. Encounter for hepatitis C screening test for low risk patient  - Hepatitis C Antibody    2. Routine general medical examination at a health care facility  - CBC & " Differential  - Comprehensive Metabolic Panel  - Lipid Panel    3. Vitamin B12 deficiency  - Vitamin B12    4. Vitamin D deficiency  - Vitamin D,25-Hydroxy    5. Chronic fatigue  - TSH  - T4, Free    6. Elevated fasting glucose  - Hemoglobin A1c    Diagnoses and all orders for this visit:    1. Encounter for hepatitis C screening test for low risk patient (Primary)  -     Hepatitis C Antibody    2. Routine general medical examination at a health care facility  -     CBC & Differential  -     Comprehensive Metabolic Panel  -     Lipid Panel    3. Vitamin B12 deficiency  -     Vitamin B12    4. Vitamin D deficiency  -     Vitamin D,25-Hydroxy    5. Chronic fatigue  -     TSH  -     T4, Free    6. Elevated fasting glucose  -     Hemoglobin A1c    7. Cardiac arrhythmia, unspecified cardiac arrhythmia type  -     Adult Transthoracic Echo Complete W/ Cont if Necessary Per Protocol; Future      Discussed testicular self exams, patient is aware how to do testicular exams and the importance of same. Discussed weight management and importance of maintaining a healthy weight. Discussed Vitamin D intake and the importance of adequate vitamin D for both Bone Health and a healthy immune system.  Discussed daily exercise and the importance of same, in regards to a healthy heart as well as helping to maintain his weight and improving his mental health.  BMI      Assessment & Plan  1. Hypertension.  His blood pressure remains elevated at 141/85, consistent with previous readings. He takes his blood pressure medication at night to avoid waking up early in the morning. A re-evaluation of his blood pressure will be conducted prior to his departure today. An echocardiogram has been recommended for further assessment of his valvular functions, given his familial history of cardiac conditions and arrhythmia. The echocardiogram will be scheduled at the [facility] in [place], and he has been advised to arrange it at his earliest  convenience.    2. Health maintenance.  He is due for a tetanus vaccine.    Return in about 3 months (around 6/26/2025) for HTN follow up. unless patient needs to be seen sooner or acute issues arise.      I have discussed the patient results/orders and and plan/recommendation with them at today's visit.    Patient or patient representative verbalized consent for the use of Ambient Listening during the visit with  COURT Ott for chart documentation. 3/26/2025  08:54 CDT  COURT Ott   03/26/2025         Fluence In J/Cm2 (Optional): 17.0

## 2025-03-27 DIAGNOSIS — E55.9 VITAMIN D DEFICIENCY: Primary | ICD-10-CM

## 2025-03-27 LAB
25(OH)D3+25(OH)D2 SERPL-MCNC: 17.2 NG/ML (ref 30–100)
ALBUMIN SERPL-MCNC: 4.6 G/DL (ref 4.3–5.2)
ALP SERPL-CCNC: 78 IU/L (ref 44–121)
ALT SERPL-CCNC: 21 IU/L (ref 0–44)
AST SERPL-CCNC: 19 IU/L (ref 0–40)
BASOPHILS # BLD AUTO: 0.1 X10E3/UL (ref 0–0.2)
BASOPHILS NFR BLD AUTO: 1 %
BILIRUB SERPL-MCNC: 0.5 MG/DL (ref 0–1.2)
BUN SERPL-MCNC: 8 MG/DL (ref 6–20)
BUN/CREAT SERPL: 9 (ref 9–20)
CALCIUM SERPL-MCNC: 9.6 MG/DL (ref 8.7–10.2)
CHLORIDE SERPL-SCNC: 103 MMOL/L (ref 96–106)
CHOLEST SERPL-MCNC: 137 MG/DL (ref 100–199)
CO2 SERPL-SCNC: 22 MMOL/L (ref 20–29)
CREAT SERPL-MCNC: 0.91 MG/DL (ref 0.76–1.27)
EGFRCR SERPLBLD CKD-EPI 2021: 116 ML/MIN/1.73
EOSINOPHIL # BLD AUTO: 0.4 X10E3/UL (ref 0–0.4)
EOSINOPHIL NFR BLD AUTO: 5 %
ERYTHROCYTE [DISTWIDTH] IN BLOOD BY AUTOMATED COUNT: 12.8 % (ref 11.6–15.4)
GLOBULIN SER CALC-MCNC: 2.8 G/DL (ref 1.5–4.5)
GLUCOSE SERPL-MCNC: 97 MG/DL (ref 70–99)
HBA1C MFR BLD: 5.7 % (ref 4.8–5.6)
HCT VFR BLD AUTO: 46 % (ref 37.5–51)
HCV IGG SERPL QL IA: NON REACTIVE
HDLC SERPL-MCNC: 44 MG/DL
HGB BLD-MCNC: 15.2 G/DL (ref 13–17.7)
IMM GRANULOCYTES # BLD AUTO: 0 X10E3/UL (ref 0–0.1)
IMM GRANULOCYTES NFR BLD AUTO: 0 %
LDLC SERPL CALC-MCNC: 83 MG/DL (ref 0–99)
LYMPHOCYTES # BLD AUTO: 1.8 X10E3/UL (ref 0.7–3.1)
LYMPHOCYTES NFR BLD AUTO: 23 %
MCH RBC QN AUTO: 30.1 PG (ref 26.6–33)
MCHC RBC AUTO-ENTMCNC: 33 G/DL (ref 31.5–35.7)
MCV RBC AUTO: 91 FL (ref 79–97)
MONOCYTES # BLD AUTO: 0.7 X10E3/UL (ref 0.1–0.9)
MONOCYTES NFR BLD AUTO: 9 %
NEUTROPHILS # BLD AUTO: 4.7 X10E3/UL (ref 1.4–7)
NEUTROPHILS NFR BLD AUTO: 62 %
PLATELET # BLD AUTO: 419 X10E3/UL (ref 150–450)
POTASSIUM SERPL-SCNC: 4.6 MMOL/L (ref 3.5–5.2)
PROT SERPL-MCNC: 7.4 G/DL (ref 6–8.5)
RBC # BLD AUTO: 5.05 X10E6/UL (ref 4.14–5.8)
SODIUM SERPL-SCNC: 138 MMOL/L (ref 134–144)
T4 FREE SERPL-MCNC: 1.33 NG/DL (ref 0.82–1.77)
TRIGL SERPL-MCNC: 46 MG/DL (ref 0–149)
TSH SERPL DL<=0.005 MIU/L-ACNC: 1.48 UIU/ML (ref 0.45–4.5)
VIT B12 SERPL-MCNC: 346 PG/ML (ref 232–1245)
VLDLC SERPL CALC-MCNC: 10 MG/DL (ref 5–40)
WBC # BLD AUTO: 7.5 X10E3/UL (ref 3.4–10.8)

## 2025-03-27 RX ORDER — ERGOCALCIFEROL 1.25 MG/1
50000 CAPSULE, LIQUID FILLED ORAL WEEKLY
Qty: 5 CAPSULE | Refills: 3 | Status: SHIPPED | OUTPATIENT
Start: 2025-03-27

## 2025-06-09 ENCOUNTER — OFFICE VISIT (OUTPATIENT)
Dept: INTERNAL MEDICINE | Facility: CLINIC | Age: 30
End: 2025-06-09
Payer: COMMERCIAL

## 2025-06-09 VITALS
WEIGHT: 304 LBS | DIASTOLIC BLOOD PRESSURE: 92 MMHG | SYSTOLIC BLOOD PRESSURE: 158 MMHG | HEIGHT: 73 IN | HEART RATE: 119 BPM | BODY MASS INDEX: 40.29 KG/M2 | TEMPERATURE: 99 F | OXYGEN SATURATION: 99 %

## 2025-06-09 DIAGNOSIS — L72.3 SEBACEOUS CYST: Primary | ICD-10-CM

## 2025-06-09 PROCEDURE — 99213 OFFICE O/P EST LOW 20 MIN: CPT | Performed by: FAMILY MEDICINE

## 2025-06-09 RX ORDER — SULFAMETHOXAZOLE AND TRIMETHOPRIM 800; 160 MG/1; MG/1
1 TABLET ORAL 2 TIMES DAILY
Qty: 14 TABLET | Refills: 0 | Status: SHIPPED | OUTPATIENT
Start: 2025-06-09 | End: 2025-06-16

## 2025-06-09 NOTE — PROGRESS NOTES
Subjective     Chief Complaint   Patient presents with    Mass     Mass on the top of the head  Lasting 7 days   Has gotten worse over the last week  Inflamed area around the top of the head and face       History of Present Illness    Patient's PMR from outside medical facility reviewed and noted.    Adolfo Monge Jr. is a 29 y.o. male who presents for a routine office visit.  Comes in secondary to a sebaceous cyst on the front of his scalp near his forehead.  Patient states that this has been swelling and enlarging over the last 3 to 4 days.  He states that he has been to the emergency room twice for the issue as busy become twice tender.  He states that they refused to anushka it or do anything with it at the time patient comes in today and it is actively ruptured within the last 48 hours.  At this time we will go ahead and start him on some antibiotics we did offer to get the patient in to surgery for removal of the sebaceous cyst however he would like to wait until it is healed up with the antibiotics.      Also comes in complaining of a rash at the top of his calfs where he wears his boots.  Patient states that this occurs recurrently does get better when he uses cream or other treatments however always comes back discussed mitigation methods for this including using baby powder at the top of his boots above his socks.    Patient otherwise reports no other new problems complaints or concerns        Past Medical History:   Past Medical History:   Diagnosis Date    Ear ache     Hypertension 08 2013     Past Surgical History:  Past Surgical History:   Procedure Laterality Date    EAR TUBES      TONSILLECTOMY       Social History:  reports that he has never smoked. His smokeless tobacco use includes snuff. He reports that he does not drink alcohol and does not use drugs.    Family History: family history includes Diabetes in his paternal grandmother and paternal uncle; Heart attack in his maternal uncle and  "paternal uncle; Hypertension in his brother, father, and mother.      Allergies:  No Known Allergies  Medications:  Prior to Admission medications    Medication Sig Start Date End Date Taking? Authorizing Provider   amLODIPine (NORVASC) 10 MG tablet Take 1 tablet by mouth Daily. 1/9/25  Yes Henny Landa APRN   olmesartan (BENICAR) 40 MG tablet Take 1 tablet by mouth Daily. 1/9/25  Yes Henny Landa APRN   vitamin D (ERGOCALCIFEROL) 1.25 MG (29896 UT) capsule capsule Take 1 capsule by mouth 1 (One) Time Per Week. 3/27/25  Yes Paola Londono APRN       DANO:      PHQ:  The PHQ has not been completed during this encounter.              Review of systems   negative unless otherwise specified above in HPI    Objective     Vital Signs: /92   Pulse 119   Temp 99 °F (37.2 °C)   Ht 185.4 cm (72.99\")   Wt (!) 138 kg (304 lb)   SpO2 99%   BMI 40.12 kg/m²     Physical Exam  Vitals and nursing note reviewed.   Constitutional:       General: He is not in acute distress.     Appearance: Normal appearance.   HENT:      Head: Normocephalic.   Eyes:      Extraocular Movements: Extraocular movements intact.      Pupils: Pupils are equal, round, and reactive to light.   Cardiovascular:      Rate and Rhythm: Normal rate and regular rhythm.      Heart sounds: Normal heart sounds. No murmur heard.  Pulmonary:      Effort: Pulmonary effort is normal. No respiratory distress.      Breath sounds: Normal breath sounds. No rhonchi or rales.   Abdominal:      General: Bowel sounds are normal. There is no distension.      Palpations: Abdomen is soft.   Skin:     Comments: Sebaceous cyst on patient's scalp actively draining and bleeding.   Neurological:      General: No focal deficit present.      Mental Status: He is alert.                Results Reviewed:  Glucose   Date Value Ref Range Status   03/26/2025 97 70 - 99 mg/dL Final     BUN   Date Value Ref Range Status   03/26/2025 8 6 - 20 mg/dL Final "     Creatinine   Date Value Ref Range Status   03/26/2025 0.91 0.76 - 1.27 mg/dL Final     Sodium   Date Value Ref Range Status   03/26/2025 138 134 - 144 mmol/L Final     Potassium   Date Value Ref Range Status   03/26/2025 4.6 3.5 - 5.2 mmol/L Final     Chloride   Date Value Ref Range Status   03/26/2025 103 96 - 106 mmol/L Final     Total CO2   Date Value Ref Range Status   03/26/2025 22 20 - 29 mmol/L Final     Calcium   Date Value Ref Range Status   03/26/2025 9.6 8.7 - 10.2 mg/dL Final     ALT (SGPT)   Date Value Ref Range Status   03/26/2025 21 0 - 44 IU/L Final     AST (SGOT)   Date Value Ref Range Status   03/26/2025 19 0 - 40 IU/L Final     WBC   Date Value Ref Range Status   03/26/2025 7.5 3.4 - 10.8 x10E3/uL Final     Hematocrit   Date Value Ref Range Status   03/26/2025 46.0 37.5 - 51.0 % Final     Platelets   Date Value Ref Range Status   03/26/2025 419 150 - 450 x10E3/uL Final     Triglycerides   Date Value Ref Range Status   03/26/2025 46 0 - 149 mg/dL Final     HDL Cholesterol   Date Value Ref Range Status   03/26/2025 44 >39 mg/dL Final     LDL Chol Calc (NIH)   Date Value Ref Range Status   03/26/2025 83 0 - 99 mg/dL Final     Hemoglobin A1C   Date Value Ref Range Status   03/26/2025 5.7 (H) 4.8 - 5.6 % Final     Comment:              Prediabetes: 5.7 - 6.4           Diabetes: >6.4           Glycemic control for adults with diabetes: <7.0               Procedure   Procedures       Assessment / Plan     Assessment/Plan:   Diagnosis Plan   1. Sebaceous cyst  sulfamethoxazole-trimethoprim (Bactrim DS) 800-160 MG per tablet            Return if symptoms worsen or fail to improve. unless patient needs to be seen sooner or acute issues arise.      I have discussed the patient results/orders and and plan/recommendation with them at today's visit.      Signed by:    Bolivar Welch MD Date: 06/09/25

## 2025-06-09 NOTE — LETTER
June 9, 2025     Patient: Adolfo Monge .   YOB: 1995   Date of Visit: 6/9/2025       To Whom It May Concern:    It is my medical opinion that Adolfo Monge may return to work in two days.         Sincerely,    Signed by:    Bolivar Welch MD Date: 06/09/25

## 2025-06-11 ENCOUNTER — TELEPHONE (OUTPATIENT)
Dept: INTERNAL MEDICINE | Facility: CLINIC | Age: 30
End: 2025-06-11
Payer: COMMERCIAL

## 2025-06-11 RX ORDER — CLINDAMYCIN HYDROCHLORIDE 300 MG/1
300 CAPSULE ORAL 3 TIMES DAILY
Qty: 21 CAPSULE | Refills: 0 | Status: SHIPPED | OUTPATIENT
Start: 2025-06-11 | End: 2025-06-18

## 2025-06-11 NOTE — TELEPHONE ENCOUNTER
DR. ROMAN  PATIENT SWOLLEN AS HE WENT TO [facility] ER AND HE IS HERE REQUESTING A DIFFERENT ANTIBIOTIC.  DR. ROMAN IS SPEAKING TO HIM NOW.  GOING TO GET A DIFFERENT ANTIBIOTIC.